# Patient Record
Sex: FEMALE | Race: WHITE | NOT HISPANIC OR LATINO | Employment: OTHER | URBAN - METROPOLITAN AREA
[De-identification: names, ages, dates, MRNs, and addresses within clinical notes are randomized per-mention and may not be internally consistent; named-entity substitution may affect disease eponyms.]

---

## 2019-11-14 ENCOUNTER — HOSPITAL ENCOUNTER (EMERGENCY)
Facility: MEDICAL CENTER | Age: 34
End: 2019-11-14
Attending: EMERGENCY MEDICINE
Payer: COMMERCIAL

## 2019-11-14 ENCOUNTER — APPOINTMENT (OUTPATIENT)
Dept: RADIOLOGY | Facility: MEDICAL CENTER | Age: 34
End: 2019-11-14
Attending: EMERGENCY MEDICINE
Payer: COMMERCIAL

## 2019-11-14 VITALS
WEIGHT: 187.17 LBS | RESPIRATION RATE: 16 BRPM | HEIGHT: 64 IN | BODY MASS INDEX: 31.95 KG/M2 | DIASTOLIC BLOOD PRESSURE: 72 MMHG | SYSTOLIC BLOOD PRESSURE: 116 MMHG | HEART RATE: 90 BPM | OXYGEN SATURATION: 96 % | TEMPERATURE: 97.7 F

## 2019-11-14 DIAGNOSIS — N61.1 BREAST ABSCESS: ICD-10-CM

## 2019-11-14 DIAGNOSIS — N64.4 BREAST PAIN: ICD-10-CM

## 2019-11-14 PROCEDURE — 76604 US EXAM CHEST: CPT

## 2019-11-14 PROCEDURE — 99284 EMERGENCY DEPT VISIT MOD MDM: CPT

## 2019-11-14 NOTE — ED NOTES
"Pt assessment complete.  Pt reports \"this is my 4th incidence this year\".  Pt reports she has had US and they told her treatment was not necessary.  Pt reports she has never been placed on ABX for this.  "

## 2019-11-14 NOTE — ED PROVIDER NOTES
"      ED Provider Note    Scribed for Rhina Ibrahim M.D. by Angelica Mederos. 11/14/2019, 3:23 PM.    Primary Care Provider: No primary care provider on file.  Means of arrival: Walk-In  History obtained from: Patient  History limited by: None    CHIEF COMPLAINT  Chief Complaint   Patient presents with   • Breast Pain     HPI  Crys Kwon is a 33 y.o. female who presents to the Emergency Department complaining of left breast pain with associated erythema and edema, onset 1 year ago. Patient states that she will experience breast pain intermittently and this is the 4th incident she has experienced this pain. Her breast pain is exacerbated with any type of contact. Per patient, she notices the pain during moments of stress and denies it being related to her menstrual periods. She denies chance of pregnancy and mentions that she has not breast fed in over 5 years. She was seen by her gynecologist a few months ago when she was asymptomatic and was referred to have an ultrasound done. She had the ultrasound with results showing a small benign cyst and was told to return in 6 months for follow-up.     REVIEW OF SYSTEMS  Pertinent positives include breast pain, redness, swelling. Pertinent negatives include no fever, nipple discharge.     PAST MEDICAL HISTORY    No past medical history on file.    SOCIAL HISTORY  None noted    SURGICAL HISTORY  patient denies any surgical history     CURRENT MEDICATIONS  No current facility-administered medications for this encounter.   No current outpatient medications on file.    ALLERGIES  Allergies   Allergen Reactions   • Iodine    • Pcn [Penicillins]        PHYSICAL EXAM  VITAL SIGNS: /94   Pulse (!) 109   Temp 36.4 °C (97.5 °F) (Temporal)   Resp 16   Ht 1.626 m (5' 4\")   Wt 84.9 kg (187 lb 2.7 oz)   SpO2 94%   BMI 32.13 kg/m²   Constitutional: Alert in no apparent distress. Well apearing  HENT: Normocephalic, Atraumatic, Bilateral external ears normal. Nose " normal.   Eyes: Conjunctiva normal, non-icteric.   Breast: On the left breast there is a 5 cm area of induration and erythema just medial and superior to the areola. Very tender to palpation.   Lungs: Non-labored respirations  Skin: Warm, Dry, No erythema, No rash.   Neurologic: Alert, Grossly non-focal.   Psychiatric: Affect normal, Judgment normal, Mood normal, Appears appropriate and not intoxicated.       RADIOLOGY  US-CHEST   Final Result      1.  2.9 x 2.3 x 1.0 cm complex hyperemic fluid collection in the superficial aspect of the left periareolar region at the 9:00 position. This is suspicious for left breast superficial abscess.      2.  Clinical follow-up after appropriate therapy is recommended to document clearance.        The radiologist's interpretations of all radiological studies have been reviewed by me.          COURSE & MEDICAL DECISION MAKING  Pertinent Labs & Imaging studies reviewed. (See chart for details)    3:23 PM - Patient seen and examined at bedside. Discussed with patient the different options available at this point such as ultrasound to evaluate for possible abscess. She is looking more for answers as to what this is. I have referred her to the breast imaging center however she is not interested in the treatments. I explained to her the possibility of breast abscess but she would still ana luisa the ultrasound. Ordered for US-chest to further evaluate.     4:33 PM - Recheck. Updated the patient on her ultrasound results indicating a small abscess or infection. Discussed treatment options with her and patient is declining antibiotic treatment.  She understands that this may get worse without appropriate treatment and will pursue outpatient imaging and work-up.      The patient will return for new or worsening symptoms and is stable at the time of discharge. Patient was given return precautions. Patient and/or family member verbalizes understanding and will comply.    DISPOSITION:  Patient  will be discharged home in stable condition.    FOLLOW UP:  [facility]  [address]  [facility] [id number] [phone]  Schedule an appointment as soon as possible for a visit       [facility], Emergency Dept  [address]  [facility] [id number] [phone]    Return for worsening redness swelling pain or other concerns.      OUTPATIENT MEDICATIONS:  New Prescriptions    No medications on file         FINAL IMPRESSION  1. Breast pain    2. Breast abscess         This dictation has been created using voice recognition software and/or scribes. The accuracy of the dictation is limited by the abilities of the software and the expertise of the scribes. I expect there may be some errors of grammar and possibly content. I made every attempt to manually correct the errors within my dictation. However, errors related to voice recognition software and/or scribes may still exist and should be interpreted within the appropriate context.     I, Angelica Mederos (Scribe), am scribing for, and in the presence of, Rhina Ibrahim M.D..    Electronically signed by: Angelica Mederos (Scribe), 11/14/2019    IRhina M.D. personally performed the services described in this documentation, as scribed by Angelica Mederos in my presence, and it is both accurate and complete. E    The note accurately reflects work and decisions made by me.  Rhina Ibrahim  11/14/2019  4:47 PM

## 2019-11-15 NOTE — DISCHARGE INSTRUCTIONS
You have a breast infection and have declined taking antibiotics.  Please return for worsening symptoms or if you change your mind and would like antibiotics for this.    US results:     1.  2.9 x 2.3 x 1.0 cm complex hyperemic fluid collection in the superficial aspect of the left periareolar region at the 9:00 position. This is suspicious for left breast superficial abscess.    2.  Clinical follow-up after appropriate therapy is recommended to document clearance.

## 2019-11-15 NOTE — ED NOTES
PT VU dcis and continues to refuse abx until follow up with breast center. Pt left A&OX4 ambulatory with steady gait to discharge. Pt left with dtr.

## 2020-07-25 VITALS
HEIGHT: 64 IN | BODY MASS INDEX: 31.31 KG/M2 | RESPIRATION RATE: 16 BRPM | OXYGEN SATURATION: 98 % | SYSTOLIC BLOOD PRESSURE: 108 MMHG | TEMPERATURE: 98.2 F | WEIGHT: 183.42 LBS | HEART RATE: 88 BPM | DIASTOLIC BLOOD PRESSURE: 75 MMHG

## 2020-07-25 PROCEDURE — 99284 EMERGENCY DEPT VISIT MOD MDM: CPT

## 2020-07-26 ENCOUNTER — HOSPITAL ENCOUNTER (EMERGENCY)
Facility: MEDICAL CENTER | Age: 35
End: 2020-07-26
Attending: EMERGENCY MEDICINE
Payer: MEDICAID

## 2020-07-26 ENCOUNTER — APPOINTMENT (OUTPATIENT)
Dept: RADIOLOGY | Facility: MEDICAL CENTER | Age: 35
End: 2020-07-26
Attending: EMERGENCY MEDICINE
Payer: MEDICAID

## 2020-07-26 DIAGNOSIS — N93.9 VAGINAL BLEEDING: ICD-10-CM

## 2020-07-26 LAB
ALBUMIN SERPL BCP-MCNC: 4.9 G/DL (ref 3.2–4.9)
ALBUMIN/GLOB SERPL: 1.8 G/DL
ALP SERPL-CCNC: 87 U/L (ref 30–99)
ALT SERPL-CCNC: 10 U/L (ref 2–50)
ANION GAP SERPL CALC-SCNC: 12 MMOL/L (ref 7–16)
APPEARANCE UR: ABNORMAL
AST SERPL-CCNC: 18 U/L (ref 12–45)
B-HCG SERPL-ACNC: 68.8 MIU/ML (ref 0–5)
BACTERIA #/AREA URNS HPF: ABNORMAL /HPF
BASOPHILS # BLD AUTO: 0.2 % (ref 0–1.8)
BASOPHILS # BLD: 0.02 K/UL (ref 0–0.12)
BILIRUB SERPL-MCNC: 0.3 MG/DL (ref 0.1–1.5)
BILIRUB UR QL STRIP.AUTO: NEGATIVE
BUN SERPL-MCNC: 9 MG/DL (ref 8–22)
CALCIUM SERPL-MCNC: 9.4 MG/DL (ref 8.5–10.5)
CHLORIDE SERPL-SCNC: 104 MMOL/L (ref 96–112)
CO2 SERPL-SCNC: 22 MMOL/L (ref 20–33)
COLOR UR: YELLOW
CREAT SERPL-MCNC: 0.72 MG/DL (ref 0.5–1.4)
EOSINOPHIL # BLD AUTO: 0.08 K/UL (ref 0–0.51)
EOSINOPHIL NFR BLD: 0.9 % (ref 0–6.9)
EPI CELLS #/AREA URNS HPF: ABNORMAL /HPF
ERYTHROCYTE [DISTWIDTH] IN BLOOD BY AUTOMATED COUNT: 44.3 FL (ref 35.9–50)
GLOBULIN SER CALC-MCNC: 2.8 G/DL (ref 1.9–3.5)
GLUCOSE SERPL-MCNC: 97 MG/DL (ref 65–99)
GLUCOSE UR STRIP.AUTO-MCNC: NEGATIVE MG/DL
HCT VFR BLD AUTO: 42.7 % (ref 37–47)
HGB BLD-MCNC: 13.9 G/DL (ref 12–16)
IMM GRANULOCYTES # BLD AUTO: 0.02 K/UL (ref 0–0.11)
IMM GRANULOCYTES NFR BLD AUTO: 0.2 % (ref 0–0.9)
KETONES UR STRIP.AUTO-MCNC: ABNORMAL MG/DL
LEUKOCYTE ESTERASE UR QL STRIP.AUTO: ABNORMAL
LIPASE SERPL-CCNC: 40 U/L (ref 11–82)
LYMPHOCYTES # BLD AUTO: 2.81 K/UL (ref 1–4.8)
LYMPHOCYTES NFR BLD: 30.1 % (ref 22–41)
MCH RBC QN AUTO: 30.7 PG (ref 27–33)
MCHC RBC AUTO-ENTMCNC: 32.6 G/DL (ref 33.6–35)
MCV RBC AUTO: 94.3 FL (ref 81.4–97.8)
MICRO URNS: ABNORMAL
MONOCYTES # BLD AUTO: 0.54 K/UL (ref 0–0.85)
MONOCYTES NFR BLD AUTO: 5.8 % (ref 0–13.4)
MUCOUS THREADS #/AREA URNS HPF: ABNORMAL /HPF
NEUTROPHILS # BLD AUTO: 5.88 K/UL (ref 2–7.15)
NEUTROPHILS NFR BLD: 62.8 % (ref 44–72)
NITRITE UR QL STRIP.AUTO: NEGATIVE
NRBC # BLD AUTO: 0 K/UL
NRBC BLD-RTO: 0 /100 WBC
NUMBER OF RH DOSES IND 8505RD: NORMAL
PH UR STRIP.AUTO: 6.5 [PH] (ref 5–8)
PLATELET # BLD AUTO: 224 K/UL (ref 164–446)
PMV BLD AUTO: 11.1 FL (ref 9–12.9)
POTASSIUM SERPL-SCNC: 3.7 MMOL/L (ref 3.6–5.5)
PROT SERPL-MCNC: 7.7 G/DL (ref 6–8.2)
PROT UR QL STRIP: NEGATIVE MG/DL
RBC # BLD AUTO: 4.53 M/UL (ref 4.2–5.4)
RBC # URNS HPF: ABNORMAL /HPF
RBC UR QL AUTO: ABNORMAL
RH BLD: NORMAL
SODIUM SERPL-SCNC: 138 MMOL/L (ref 135–145)
SP GR UR STRIP.AUTO: 1.03
UROBILINOGEN UR STRIP.AUTO-MCNC: 1 MG/DL
WBC # BLD AUTO: 9.4 K/UL (ref 4.8–10.8)
WBC #/AREA URNS HPF: ABNORMAL /HPF

## 2020-07-26 PROCEDURE — 84702 CHORIONIC GONADOTROPIN TEST: CPT

## 2020-07-26 PROCEDURE — 80053 COMPREHEN METABOLIC PANEL: CPT

## 2020-07-26 PROCEDURE — 85025 COMPLETE CBC W/AUTO DIFF WBC: CPT

## 2020-07-26 PROCEDURE — 36415 COLL VENOUS BLD VENIPUNCTURE: CPT

## 2020-07-26 PROCEDURE — 86901 BLOOD TYPING SEROLOGIC RH(D): CPT

## 2020-07-26 PROCEDURE — 81001 URINALYSIS AUTO W/SCOPE: CPT

## 2020-07-26 PROCEDURE — 76801 OB US < 14 WKS SINGLE FETUS: CPT

## 2020-07-26 PROCEDURE — 83690 ASSAY OF LIPASE: CPT

## 2020-07-26 NOTE — DISCHARGE INSTRUCTIONS
You are seen in the emergency department for vaginal bleeding the setting of a positive pregnancy test.  Your beta-hCG is low and your ultrasound does not show pregnancy.  This most likely means of miscarriage, however it is very important you get a repeat beta-hCG level to ensure that it is heading down to 0.    Please contact the women's health to arrange for an appointment.    Please return to the emergency department or seek medical attention if you develop:  Fevers, excessive bleeding causing lightheadedness or feeling like he might pass out, severe abdominal pain, any other new or concerning findings    ================================  Coronavirus Information    Your visit did NOT appear to relate to coronavirus, but if you or your family develop symptoms that concern you for coronavirus (please see CDC website for symptoms), please contact the [facility] [place] (or your local health department)  or your healthcare provider before going to a medical facility:    [facility]  24hr hotline: 600.322.7277    Information is available from the Centers for Disease Control and Prevention  www.CDC.gov    and     [facility]  [email or web address]    If you are severely ill or having a hard time breathing, please immediatly seek medical care. Notify the  or Emergency Department Triage about your symptoms.

## 2020-07-26 NOTE — ED PROVIDER NOTES
ED Provider Note    Scribed for Elvis Alicea M.D. by Remy Rocha. 2020,  12:21 AM.    Means of Arrival: Walk-in  History obtained from: Patient  History limited by: None    CHIEF COMPLAINT  Chief Complaint   Patient presents with   • Pregnancy     6 weeks, has not recieved US.    • Vaginal Bleeding     x1 hour, described as red/brown spotting.        HPI  Crys Kwon is a 34 y.o. female with a pregnancy history of  who is six weeks pregnancy by home pregnancy test and dates who presents to the Emergency Department complaining of vaginal bleeding that began today. The bleeding began as some mild spotting and has since progressed to bleeding more consistent with her normal menstrual bleeding. Her last normal menstrual period was six weeks ago. She also complains of some mild abdominal pain. No fever or chills. She denies a history of miscarriage.    PPE Note: I personally donned full PPE for all patient encounters during this visit, including being clean-shaven with a surgical mask, gloves, and goggles.     Scribe remained outside the patient's room and did not have any contact with the patient for the duration of patient encounter.     REVIEW OF SYSTEMS  CONSTITUTIONAL:  No fever or chills.  GASTROINTESTINAL:  Positive for abdominal pain.  GENITOURINARY:   Positive for vaginal bleeding     See HPI for further details.   All other systems are negative.     PAST MEDICAL HISTORY  History reviewed. No pertinent past medical history.    FAMILY HISTORY  History reviewed. No pertinent family history.    SOCIAL HISTORY   reports that she has never smoked. She has never used smokeless tobacco. She reports previous alcohol use. She reports previous drug use.    SURGICAL HISTORY  History reviewed. No pertinent surgical history.    CURRENT MEDICATIONS  Home Medications     Reviewed by Jeff Gleason R.N. (Registered Nurse) on 20 at 2320  Med List Status: Partial   Medication Last Dose Status  "       Patient Enrique Taking any Medications                       ALLERGIES  Allergies   Allergen Reactions   • Iodine    • Pcn [Penicillins]        PHYSICAL EXAM  VITAL SIGNS: /75   Pulse 88   Temp 36.8 °C (98.2 °F) (Temporal)   Resp 16   Ht 1.626 m (5' 4\")   Wt 83.2 kg (183 lb 6.8 oz)   SpO2 98%   BMI 31.48 kg/m²    Gen: Alert, no acute distress  HEENT: ATNC  Eyes: PERRL, EOMI, normal conjunctiva.   Neck: trachea midline  Resp: no respiratory distress, clear to auscultation  CV: No JVD, no murmur, no rub, no gallop  Abd: non-distended, soft, Minimal lower abdominal tenderness  Pelvic: Patient declines pelvic exam  Ext: No deformities  Psych: normal mood  Neuro: speech fluent    DIAGNOSTIC STUDIES / PROCEDURES     LABS  Labs Reviewed   CBC WITH DIFFERENTIAL - Abnormal; Notable for the following components:       Result Value    MCHC 32.6 (*)     All other components within normal limits   HCG QUANTITATIVE - Abnormal; Notable for the following components:    Bhcg 68.8 (*)     All other components within normal limits   URINALYSIS,CULTURE IF INDICATED - Abnormal; Notable for the following components:    Character Hazy (*)     Ketones Trace (*)     Leukocyte Esterase Small (*)     Occult Blood Large (*)     All other components within normal limits   URINE MICROSCOPIC (W/UA) - Abnormal; Notable for the following components:    RBC 5-10 (*)     Bacteria Moderate (*)     All other components within normal limits   COMP METABOLIC PANEL   LIPASE   RH TYPE FOR RHOGAM FROM E.D.    Narrative:     Print Consent?->No   ESTIMATED GFR     All labs reviewed by me.    RADIOLOGY  US-OB 1ST TRIMESTER WITH TRANSVAGINAL (COMBO)   Final Result      No evidence of intrauterine pregnancy.        The radiologist’s interpretation of all radiology studies have been reviewed by me.    COURSE & MEDICAL DECISION MAKING  Pertinent Labs & Imaging studies reviewed. (See chart for details)    12:21 AM Patient seen and examined at " bedside. Ordered for labs and imaging to evaluate.    1:35 AM Patient declined pelvic exam.    1:55 AM - Re-examined; The patient is resting in bed comfortably. I discussed plans for discharge. She was given a referral to [facility] and instructed to return to the ED if her symptoms worsen. Patient understands and agrees.    Medical Decision Making:  Patient with positive pregnancy test presents with vaginal bleeding.  Her hCG is quite low, no IUP on ultrasound.  Low suspicion for ectopic.  This is most likely a miscarriage.  Patient was not willing to undergo pelvic exam to evaluate for os opening or presence of tissue.  With negative ultrasound, presence of tissue unlikely.  She was counseled repeatedly low the importance of following up her beta-hCG.  Patient was given an order form to obtain the hCG quant in 2 days.  She is referred to the [facility] for follow-up.  She was given return precautions, anticipatory guidance.  She is Rh+.  No urinary symptoms to suggest urinary tract infection.    The patient will return for new or worsening symptoms and is stable at the time of discharge.    DISPOSITION:  Patient will be discharged home in stable condition.    FOLLOW UP:  [address]  [facility] [id number] [phone]  Schedule an appointment as soon as possible for a visit       [facility], Emergency Dept  [address] [facility] [address] [phone]    If symptoms worsen      FINAL IMPRESSION  1. Vaginal bleeding            Remy SNOWDEN (Rashaad), am scribing for, and in the presence of, Elvis Alicea M.D..    Electronically signed by: Remy Rocha (Rashaad), 7/26/2020    Elvis SNOWDEN M.D. personally performed the services described in this documentation, as scribed by Remy Rocha in my presence, and it is both accurate and complete.    C    The note accurately  reflects work and decisions made by me.  Elvis Alicea M.D.  7/26/2020  4:43 AM

## 2020-07-26 NOTE — ED TRIAGE NOTES
"[employer]  34 y.o. female  Chief Complaint   Patient presents with   • Pregnancy     6 weeks, has not recieved US.    • Vaginal Bleeding     x1 hour, described as red/brown spotting.      Pt ambulated to triage with steady gait for above complaint.     Pt given urine specimen cup.     Pt denies travel outside [facility] in the past 14 days, and denies respiratory symptoms.     Pt back to [facility], mask applied. Educated to inform staff of any concerns or changes.     Blood Pressure: 108/75, Pulse: 88, Respiration: 16, Temperature: 36.8 °C (98.2 °F), Height: 162.6 cm (5' 4\"), Weight: 83.2 kg (183 lb 6.8 oz), BMI (Calculated): 31.48, BSA (Calculated): 1.9, Pulse Oximetry: 98 %    "

## 2020-07-26 NOTE — ED NOTES
Discharge education provided. Patient given lab slip for follow up. Patient discharged to self care with all belongings.

## 2020-08-06 ENCOUNTER — APPOINTMENT (OUTPATIENT)
Dept: RADIOLOGY | Facility: MEDICAL CENTER | Age: 35
End: 2020-08-06
Attending: EMERGENCY MEDICINE
Payer: MEDICAID

## 2020-08-06 ENCOUNTER — HOSPITAL ENCOUNTER (EMERGENCY)
Facility: MEDICAL CENTER | Age: 35
End: 2020-08-07
Attending: EMERGENCY MEDICINE
Payer: MEDICAID

## 2020-08-06 DIAGNOSIS — O03.9 MISCARRIAGE: ICD-10-CM

## 2020-08-06 LAB
ALBUMIN SERPL BCP-MCNC: 4.7 G/DL (ref 3.2–4.9)
ALBUMIN/GLOB SERPL: 1.6 G/DL
ALP SERPL-CCNC: 94 U/L (ref 30–99)
ALT SERPL-CCNC: 9 U/L (ref 2–50)
ANION GAP SERPL CALC-SCNC: 15 MMOL/L (ref 7–16)
AST SERPL-CCNC: 12 U/L (ref 12–45)
B-HCG SERPL-ACNC: 21.5 MIU/ML (ref 0–5)
BASOPHILS # BLD AUTO: 0.4 % (ref 0–1.8)
BASOPHILS # BLD: 0.04 K/UL (ref 0–0.12)
BILIRUB SERPL-MCNC: 0.2 MG/DL (ref 0.1–1.5)
BUN SERPL-MCNC: 9 MG/DL (ref 8–22)
CALCIUM SERPL-MCNC: 9.2 MG/DL (ref 8.5–10.5)
CHLORIDE SERPL-SCNC: 101 MMOL/L (ref 96–112)
CO2 SERPL-SCNC: 24 MMOL/L (ref 20–33)
CREAT SERPL-MCNC: 0.82 MG/DL (ref 0.5–1.4)
EOSINOPHIL # BLD AUTO: 0.12 K/UL (ref 0–0.51)
EOSINOPHIL NFR BLD: 1.2 % (ref 0–6.9)
ERYTHROCYTE [DISTWIDTH] IN BLOOD BY AUTOMATED COUNT: 44.3 FL (ref 35.9–50)
GLOBULIN SER CALC-MCNC: 3 G/DL (ref 1.9–3.5)
GLUCOSE SERPL-MCNC: 103 MG/DL (ref 65–99)
HCT VFR BLD AUTO: 44.4 % (ref 37–47)
HGB BLD-MCNC: 14.5 G/DL (ref 12–16)
IMM GRANULOCYTES # BLD AUTO: 0.02 K/UL (ref 0–0.11)
IMM GRANULOCYTES NFR BLD AUTO: 0.2 % (ref 0–0.9)
LYMPHOCYTES # BLD AUTO: 2.92 K/UL (ref 1–4.8)
LYMPHOCYTES NFR BLD: 29.9 % (ref 22–41)
MCH RBC QN AUTO: 31.3 PG (ref 27–33)
MCHC RBC AUTO-ENTMCNC: 32.7 G/DL (ref 33.6–35)
MCV RBC AUTO: 95.9 FL (ref 81.4–97.8)
MONOCYTES # BLD AUTO: 0.45 K/UL (ref 0–0.85)
MONOCYTES NFR BLD AUTO: 4.6 % (ref 0–13.4)
NEUTROPHILS # BLD AUTO: 6.21 K/UL (ref 2–7.15)
NEUTROPHILS NFR BLD: 63.7 % (ref 44–72)
NRBC # BLD AUTO: 0 K/UL
NRBC BLD-RTO: 0 /100 WBC
PLATELET # BLD AUTO: 261 K/UL (ref 164–446)
PMV BLD AUTO: 10.7 FL (ref 9–12.9)
POTASSIUM SERPL-SCNC: 4.2 MMOL/L (ref 3.6–5.5)
PROT SERPL-MCNC: 7.7 G/DL (ref 6–8.2)
RBC # BLD AUTO: 4.63 M/UL (ref 4.2–5.4)
SODIUM SERPL-SCNC: 140 MMOL/L (ref 135–145)
WBC # BLD AUTO: 9.8 K/UL (ref 4.8–10.8)

## 2020-08-06 PROCEDURE — 80053 COMPREHEN METABOLIC PANEL: CPT

## 2020-08-06 PROCEDURE — 85025 COMPLETE CBC W/AUTO DIFF WBC: CPT

## 2020-08-06 PROCEDURE — 99284 EMERGENCY DEPT VISIT MOD MDM: CPT

## 2020-08-06 PROCEDURE — 84702 CHORIONIC GONADOTROPIN TEST: CPT

## 2020-08-06 PROCEDURE — 76801 OB US < 14 WKS SINGLE FETUS: CPT

## 2020-08-06 ASSESSMENT — FIBROSIS 4 INDEX: FIB4 SCORE: 0.89

## 2020-08-07 VITALS
HEIGHT: 64 IN | SYSTOLIC BLOOD PRESSURE: 110 MMHG | RESPIRATION RATE: 16 BRPM | TEMPERATURE: 98.3 F | BODY MASS INDEX: 31.47 KG/M2 | WEIGHT: 184.3 LBS | HEART RATE: 82 BPM | DIASTOLIC BLOOD PRESSURE: 72 MMHG | OXYGEN SATURATION: 96 %

## 2020-08-07 NOTE — ED TRIAGE NOTES
"Crys Kwon   34 y.o. female   Chief Complaint   Patient presents with   • Vaginal Bleeding     pt was seen here 12 days ago for threatened miscarriage. pt stopped bleeding, then started having bleeding over the last 2 days again. pt took another pregnancy test and it was positive, shes concerned  that she was still pregnant and now could be miscarrying       Pt using thin panty liners, reports bleeding as scant.   Pt amb to triage with steady gait for above complaint  Pt is alert and oriented, speaking in full sentences, follows commands and responds appropriately to questions. NAD. Resp are even and unlabored.   Pt placed in lobby. Pt educated on triage process. Pt encouraged to alert staff for any changes.    /67   Pulse 79   Temp 36.7 °C (98.1 °F) (Temporal)   Resp 17   Ht 1.626 m (5' 4\")   Wt 83.6 kg (184 lb 4.9 oz)   SpO2 97%   BMI 31.64 kg/m²     "

## 2020-08-07 NOTE — DISCHARGE INSTRUCTIONS
It is incredibly important that you follow-up with the women's health, for majority of the pregnancy center either tomorrow or early next week.  If you develop worsening bleeding, significant abdominal pain faintness or any other concerns please return immediately to the emergency department.

## 2020-08-07 NOTE — ED PROVIDER NOTES
"ED Provider Note    CHIEF COMPLAINT  Chief Complaint   Patient presents with   • Vaginal Bleeding     pt was seen here 12 days ago for threatened miscarriage. pt stopped bleeding, then started having bleeding over the last 2 days again. pt took another pregnancy test and it was positive, shes concerned  that she was still pregnant and now could be miscarrying        HPI  Crys Kwon is a 34 y.o. female G5, P4, 6 weeks pregnancy by date who was seen here 11 days prior vaginal bleeding and pain in the setting of pregnancy.  Patient's beta-hCG was 68.8 at that time, her ultrasound was unremarkable.  Patient reports the bleeding resolved but the symptoms recurred, she took another pregnancy test today which was positive and therefore she presented back to the emergency department.  Patient reports she has had some minimal spotting.  She denies any abdominal pain.  She denies any faintness.  Patient denies any dizziness nausea or vomiting.  She denies any dysuria urgency or frequency.  Reports that she has been taking daily pregnancy tests, they were negative for a while and then she thinks they might be positive but she is not entirely sure as \"she could not quite tell\"    REVIEW OF SYSTEMS  ROS  See HPI for further details. All other systems are negative.     PAST MEDICAL HISTORY   has a past medical history of Patient denies medical problems.    SOCIAL HISTORY  Social History     Tobacco Use   • Smoking status: Never Smoker   • Smokeless tobacco: Never Used   Substance and Sexual Activity   • Alcohol use: Not Currently   • Drug use: Not Currently   • Sexual activity: Not on file       SURGICAL HISTORY  patient denies any surgical history    CURRENT MEDICATIONS  Home Medications     Reviewed by Gianna Toth R.N. (Registered Nurse) on 08/06/20 at 2031  Med List Status: Complete   Medication Last Dose Status        Patient Enrique Taking any Medications                       ALLERGIES  Allergies   Allergen " Reactions   • Iodine    • Pcn [Penicillins]      PHYSICAL EXAM  Physical Exam   Constitutional: She is oriented to person, place, and time. She appears well-developed and well-nourished.   HENT:   Head: Normocephalic and atraumatic.   Eyes: Conjunctivae are normal.   Neck: Normal range of motion. Neck supple.   Cardiovascular: Normal rate and regular rhythm.   Pulmonary/Chest: Effort normal and breath sounds normal.   Abdominal: Soft. Bowel sounds are normal. She exhibits no distension. There is no abdominal tenderness. There is no rebound.   Neurological: She is alert and oriented to person, place, and time.   Skin: Skin is warm and dry. No rash noted.   Psychiatric: She has a normal mood and affect. Her behavior is normal.     Pelvic refused    COURSE & MEDICAL DECISION MAKING  Pertinent Labs & Imaging studies reviewed. (See chart for details)    Patient here with ongoing vaginal spotting.  Her abdominal exam is entirely benign.  I believe ectopic pregnancy is unlikely.  Patient beta-hCG is dropping which is most consistent with a miscarriage.  Highly atypical of ectopic pregnancy, but I did check an ultrasound to confirm there is no obvious large adnexal mass.  There is no.  Nevertheless I have encouraged patient to follow-up with [facility] center to ensure that she has close follow-up and potential repeat ultrasound there.  I discussed return precautions in depth with patient.  Patient has continually deferred any pelvic exam, I discussed with her that we could miss a potential lesion or infection without this but patient continues to refuse.  Patient denies any vaginal discharge dysuria urgency or frequency or infectious symptoms.     The patient will return for worsening symptoms and is stable at the time of discharge. The patient verbalizes understanding and will comply.    FINAL IMPRESSION     1. Miscarriage               Electronically signed by: Sanjay Gee M.D., 8/6/2020 11:00 PM

## 2020-08-07 NOTE — ED NOTES
Report received from Brynn COLUNGA. Patient hooked up to monitors. VSS. Patient to discharge home soon per ERP.

## 2020-12-25 ENCOUNTER — HOSPITAL ENCOUNTER (EMERGENCY)
Facility: MEDICAL CENTER | Age: 35
End: 2020-12-25
Attending: EMERGENCY MEDICINE
Payer: MEDICAID

## 2020-12-25 VITALS
TEMPERATURE: 96.8 F | DIASTOLIC BLOOD PRESSURE: 71 MMHG | OXYGEN SATURATION: 95 % | SYSTOLIC BLOOD PRESSURE: 111 MMHG | RESPIRATION RATE: 16 BRPM | HEIGHT: 64 IN | HEART RATE: 89 BPM | BODY MASS INDEX: 27.33 KG/M2 | WEIGHT: 160.05 LBS

## 2020-12-25 DIAGNOSIS — Z86.39 HISTORY OF HORMONE DISTURBANCE: ICD-10-CM

## 2020-12-25 LAB
B-HCG SERPL-ACNC: <1 MIU/ML (ref 0–5)
PROGEST SERPL-MCNC: 24.2 NG/ML

## 2020-12-25 PROCEDURE — 84144 ASSAY OF PROGESTERONE: CPT

## 2020-12-25 PROCEDURE — 36415 COLL VENOUS BLD VENIPUNCTURE: CPT

## 2020-12-25 PROCEDURE — 99283 EMERGENCY DEPT VISIT LOW MDM: CPT

## 2020-12-25 PROCEDURE — 84702 CHORIONIC GONADOTROPIN TEST: CPT

## 2020-12-25 RX ORDER — ASPIRIN 81 MG/1
81 TABLET, CHEWABLE ORAL DAILY
COMMUNITY

## 2020-12-25 ASSESSMENT — FIBROSIS 4 INDEX: FIB4 SCORE: .5517241379310344828

## 2020-12-25 ASSESSMENT — ENCOUNTER SYMPTOMS
NAUSEA: 0
ABDOMINAL PAIN: 0
VOMITING: 0

## 2020-12-25 NOTE — ED NOTES
Patient verbalized understanding of discharge instructions, provided with discharge paperwork, gait steady, ambulated independently to SIMONE stokes.

## 2020-12-25 NOTE — ED PROVIDER NOTES
ED Provider Note    Means of arrival: walk-in  History obtained from: patient  History limited by: none    CHIEF COMPLAINT  Chief Complaint   Patient presents with   • Other     pt has had 3 miscarriages this year, is concerned that her progesterone is too low and would like it checked. pt tested positive for pregnancy this week.      PPE Note: I personally donned full PPE for all patient encounters during this visit, including wearing a mask and gloves. Scribe remained outside the patient's room and did not have any contact with the patient for the duration of patient encounter.      HPI  Crys Kwon is a 35 y.o. female who presents to the Emergency Department for evaluation of progesterone level.  Patient reports that she has had 3 miscarriages this year, previously 9 years ago had healthy pregnancies.  Patient was started on progesterone prophylactically by her obstetrician Dr. Lety Claire as she is still trying to get pregnant and hoping to have a healthy pregnancy.  Patient reports that her last menstrual period was at the end of November and a couple of days ago had a positive home urine pregnancy test.  Patient reports that she is very anxious that she is not on enough progesterone in order to support a healthy pregnancy and would like her progesterone level evaluated.  She states over the holidays she will not be able to get her into her obstetrician in a timely manner and therefore came into the emergency department for further evaluation.  She denies abdominal pain, nausea, vomiting, and vaginal bleeding.    REVIEW OF SYSTEMS  Review of Systems   Gastrointestinal: Negative for abdominal pain, nausea and vomiting.   Genitourinary: Negative for dysuria.        Denies vaginal bleeding         PAST MEDICAL HISTORY   has a past medical history of Patient denies medical problems.    SURGICAL HISTORY  patient denies any surgical history    SOCIAL HISTORY  Social History     Tobacco Use   • Smoking status:  "Never Smoker   • Smokeless tobacco: Never Used   Substance Use Topics   • Alcohol use: Not Currently   • Drug use: Not Currently      Social History     Substance and Sexual Activity   Drug Use Not Currently       FAMILY HISTORY  History reviewed. No pertinent family history.    CURRENT MEDICATIONS  Home Medications     Reviewed by Leanne Marsh R.N. (Registered Nurse) on 12/25/20 at 0234  Med List Status: Complete   Medication Last Dose Status   aspirin (ASA) 81 MG Chew Tab chewable tablet  Active   progesterone (PROMETRIUM) 100 MG Cap  Active              ALLERGIES  Allergies   Allergen Reactions   • Iodine    • Pcn [Penicillins]        PHYSICAL EXAM  VITAL SIGNS: /88   Pulse 93   Temp 36 °C (96.8 °F) (Temporal)   Resp 16   Ht 1.626 m (5' 4\")   Wt 72.6 kg (160 lb 0.9 oz)   SpO2 95%   BMI 27.47 kg/m²   Vitals reviewed by myself.  Physical Exam  Nursing note and vitals reviewed.  Constitutional: Well-developed and well-nourished. No acute distress.   HENT: Head is normocephalic and atraumatic.  Eyes: extra-ocular movements intact  Cardiovascular: Regular rate and regular rhythm  Pulmonary/Chest: Normal respiratory effort  Musculoskeletal: Extremities exhibit normal range of motion without edema or tenderness.   Neurological: Awake and alert  Skin: Skin is warm and dry. No rash.        DIAGNOSTIC STUDIES /  LABS  Labs Reviewed   PROGESTERONE   HCG QUANTITATIVE     All labs reviewed by me.    REASSESSMENT  2:43 AM Patient was seen and evaluated. Ordered for progesterone levels and HCG quant to evaluate her symptoms.      3:02 AM I discussed the patient's case and the above findings with Dr. Bedoya (OB/GYN) who advised that the patient is currently at the highest progesterone dosage.    COURSE & MEDICAL DECISION MAKING  Nursing notes, VS, PMSFHx reviewed in chart.    Patient is a 35-year-old female who comes in for evaluation of concern for preventing miscarriage.  On exam patient is " well-appearing with vitals in normal limits.  She has no signs or symptoms concerning for threatened  as she is not having any abdominal pain or bleeding.  I advised patient that in the emergency department we do not routinely prescribe medications to prevent miscarriage, but this is done by OB/GYN specialists, and she will need to follow-up with Dr. Lety Claire.  I did discuss the case with on-call OB Dr. Bedoya (for Dr. Lety Claire) who agrees that this is outpatient follow-up.  Patient is on maximum dose of outpatient progesterone at 200 mg daily and there is no indication to increase her dose at this time.  She advises on obtaining baseline quantitative hCG and progesterone which can be followed in the clinic.  I advised patient of this plan and she is amenable to this, however would like to wait for her hCG and progesterone levels as she would like to know them prior to discharge.  Patient's beta quant returns and is undetectable, unclear if she had a false positive pregnancy at home.  Progesterone level is 24.  I advised patient of the findings and that currently she does not appear to be pregnant.  Patient will follow up with her obstetrician.  She is then discharged in stable condition.    The patient will return for new or worsening symptoms and is stable at the time of discharge. The patient is referred to a primary physician for blood pressure management, diabetic screening, and for all other preventative health concerns.    DISPOSITION:  Patient will be discharged home in stable condition.    FOLLOW UP:  Lety Watts M.D.  [address] [place] [id number]  [phone]            FINAL IMPRESSION  1. History of hormone disturbance          I, Tono Low (Rashaad), am scribing for, and in the presence of, No att. providers found.    Electronically signed by: Tono Low (Rashaad), 2020    I, No att. providers found personally performed the services described in this  documentation, as scribed by Tono Low in my presence, and it is both accurate and complete.    The note accurately reflects work and decisions made by me.  Maria Eugenia Montiel M.D.  12/25/2020  4:27 AM

## 2020-12-25 NOTE — ED NOTES
Assist RN:  Pt ambulated from lobby to [facility] without assistance, tolerated well. Pt is now sitting up in bed with even and unlabored breaths, in no apparent distress at this time. Will continue to monitor pt while awaiting test results and further orders.

## 2020-12-25 NOTE — ED TRIAGE NOTES
"Crys Kwon  35 y.o. female  Chief Complaint   Patient presents with   • Other     pt has had 3 miscarriages this year, is concerned that her progesterone is too low and would like it checked. pt tested positive for pregnancy this week.        Patient ambulatory to triage with a steady gait for above complaint. Pt reports light spotting, denies any other complaint. Pt takes 200mg progesterone daily but is concerned that now that she is pregnant, it may not be enough.    Patient is alert and oriented, speaking in full sentences, following commands, and responding appropriately to questions. Educated on triage process and instructed patient to alert staff to any changes in condition or worsening symptoms.     /88   Pulse 93   Temp 36 °C (96.8 °F) (Temporal)   Resp 16   Ht 1.626 m (5' 4\")   Wt 72.6 kg (160 lb 0.9 oz)   SpO2 95%   BMI 27.47 kg/m²       "

## 2020-12-25 NOTE — DISCHARGE INSTRUCTIONS
Your pregnancy blood level was undetectable, your progesterone level today is 24.2.  Follow-up with your gynecologist

## 2021-03-19 ENCOUNTER — HOSPITAL ENCOUNTER (EMERGENCY)
Facility: MEDICAL CENTER | Age: 36
End: 2021-03-19
Attending: EMERGENCY MEDICINE
Payer: MEDICAID

## 2021-03-19 ENCOUNTER — APPOINTMENT (OUTPATIENT)
Dept: RADIOLOGY | Facility: MEDICAL CENTER | Age: 36
End: 2021-03-19
Attending: EMERGENCY MEDICINE
Payer: MEDICAID

## 2021-03-19 VITALS
HEART RATE: 77 BPM | TEMPERATURE: 98.3 F | SYSTOLIC BLOOD PRESSURE: 115 MMHG | BODY MASS INDEX: 30.37 KG/M2 | WEIGHT: 177.91 LBS | DIASTOLIC BLOOD PRESSURE: 63 MMHG | OXYGEN SATURATION: 95 % | RESPIRATION RATE: 16 BRPM | HEIGHT: 64 IN

## 2021-03-19 DIAGNOSIS — O03.9 SPONTANEOUS MISCARRIAGE: ICD-10-CM

## 2021-03-19 LAB
ALBUMIN SERPL BCP-MCNC: 4.3 G/DL (ref 3.2–4.9)
ALBUMIN/GLOB SERPL: 1.4 G/DL
ALP SERPL-CCNC: 89 U/L (ref 30–99)
ALT SERPL-CCNC: 15 U/L (ref 2–50)
ANION GAP SERPL CALC-SCNC: 12 MMOL/L (ref 7–16)
APPEARANCE UR: CLEAR
AST SERPL-CCNC: 16 U/L (ref 12–45)
B-HCG SERPL-ACNC: 70.1 MIU/ML (ref 0–5)
BACTERIA #/AREA URNS HPF: ABNORMAL /HPF
BASOPHILS # BLD AUTO: 0.2 % (ref 0–1.8)
BASOPHILS # BLD: 0.02 K/UL (ref 0–0.12)
BILIRUB SERPL-MCNC: 0.3 MG/DL (ref 0.1–1.5)
BILIRUB UR QL STRIP.AUTO: NEGATIVE
BUN SERPL-MCNC: 11 MG/DL (ref 8–22)
CALCIUM SERPL-MCNC: 9.3 MG/DL (ref 8.5–10.5)
CHLORIDE SERPL-SCNC: 103 MMOL/L (ref 96–112)
CO2 SERPL-SCNC: 22 MMOL/L (ref 20–33)
COLOR UR: YELLOW
CREAT SERPL-MCNC: 0.83 MG/DL (ref 0.5–1.4)
EOSINOPHIL # BLD AUTO: 0.08 K/UL (ref 0–0.51)
EOSINOPHIL NFR BLD: 0.8 % (ref 0–6.9)
EPI CELLS #/AREA URNS HPF: ABNORMAL /HPF
ERYTHROCYTE [DISTWIDTH] IN BLOOD BY AUTOMATED COUNT: 44.7 FL (ref 35.9–50)
GLOBULIN SER CALC-MCNC: 3.1 G/DL (ref 1.9–3.5)
GLUCOSE SERPL-MCNC: 100 MG/DL (ref 65–99)
GLUCOSE UR STRIP.AUTO-MCNC: NEGATIVE MG/DL
HCT VFR BLD AUTO: 39.1 % (ref 37–47)
HGB BLD-MCNC: 13.1 G/DL (ref 12–16)
HYALINE CASTS #/AREA URNS LPF: ABNORMAL /LPF
IMM GRANULOCYTES # BLD AUTO: 0.03 K/UL (ref 0–0.11)
IMM GRANULOCYTES NFR BLD AUTO: 0.3 % (ref 0–0.9)
KETONES UR STRIP.AUTO-MCNC: ABNORMAL MG/DL
LEUKOCYTE ESTERASE UR QL STRIP.AUTO: ABNORMAL
LIPASE SERPL-CCNC: 24 U/L (ref 11–82)
LYMPHOCYTES # BLD AUTO: 2.38 K/UL (ref 1–4.8)
LYMPHOCYTES NFR BLD: 23.5 % (ref 22–41)
MCH RBC QN AUTO: 31.6 PG (ref 27–33)
MCHC RBC AUTO-ENTMCNC: 33.5 G/DL (ref 33.6–35)
MCV RBC AUTO: 94.2 FL (ref 81.4–97.8)
MICRO URNS: ABNORMAL
MONOCYTES # BLD AUTO: 0.6 K/UL (ref 0–0.85)
MONOCYTES NFR BLD AUTO: 5.9 % (ref 0–13.4)
NEUTROPHILS # BLD AUTO: 7 K/UL (ref 2–7.15)
NEUTROPHILS NFR BLD: 69.3 % (ref 44–72)
NITRITE UR QL STRIP.AUTO: NEGATIVE
NRBC # BLD AUTO: 0 K/UL
NRBC BLD-RTO: 0 /100 WBC
PH UR STRIP.AUTO: 5.5 [PH] (ref 5–8)
PLATELET # BLD AUTO: 247 K/UL (ref 164–446)
PMV BLD AUTO: 11.1 FL (ref 9–12.9)
POTASSIUM SERPL-SCNC: 3.9 MMOL/L (ref 3.6–5.5)
PROT SERPL-MCNC: 7.4 G/DL (ref 6–8.2)
PROT UR QL STRIP: NEGATIVE MG/DL
RBC # BLD AUTO: 4.15 M/UL (ref 4.2–5.4)
RBC # URNS HPF: ABNORMAL /HPF
RBC UR QL AUTO: NEGATIVE
SODIUM SERPL-SCNC: 137 MMOL/L (ref 135–145)
SP GR UR STRIP.AUTO: 1.03
UROBILINOGEN UR STRIP.AUTO-MCNC: 0.2 MG/DL
WBC # BLD AUTO: 10.1 K/UL (ref 4.8–10.8)
WBC #/AREA URNS HPF: ABNORMAL /HPF

## 2021-03-19 PROCEDURE — 83690 ASSAY OF LIPASE: CPT

## 2021-03-19 PROCEDURE — 81001 URINALYSIS AUTO W/SCOPE: CPT

## 2021-03-19 PROCEDURE — 87077 CULTURE AEROBIC IDENTIFY: CPT | Mod: 91

## 2021-03-19 PROCEDURE — 84702 CHORIONIC GONADOTROPIN TEST: CPT

## 2021-03-19 PROCEDURE — 85025 COMPLETE CBC W/AUTO DIFF WBC: CPT

## 2021-03-19 PROCEDURE — 87186 SC STD MICRODIL/AGAR DIL: CPT

## 2021-03-19 PROCEDURE — 99284 EMERGENCY DEPT VISIT MOD MDM: CPT

## 2021-03-19 PROCEDURE — 86901 BLOOD TYPING SEROLOGIC RH(D): CPT

## 2021-03-19 PROCEDURE — 76801 OB US < 14 WKS SINGLE FETUS: CPT

## 2021-03-19 PROCEDURE — 80053 COMPREHEN METABOLIC PANEL: CPT

## 2021-03-19 PROCEDURE — 87086 URINE CULTURE/COLONY COUNT: CPT

## 2021-03-19 ASSESSMENT — FIBROSIS 4 INDEX: FIB4 SCORE: .5517241379310344828

## 2021-03-19 NOTE — LETTER
3/23/2021               Crys Kwon  [address]  [place] [address]        Dear Crys (MR#[id number])    As we have been unable to contact you by phone, this letter is sent in regards to your recent visit to the [facility] Emergency Department on 3/19/2021.  During the visit, tests were performed to assist the physician in a medical diagnosis.  A review of those tests requires that we notify you of the following:    Your urine culture and sensitivity was POSITIVE for a bacteria called Escherichia coli, and further treatment may be necessary. IF YOU ARE NOT FEELING BETTER PLEASE CONTACT ME AS SOON AS POSSIBLE AT THE NUMBER BELOW.       Thank you for your cooperation in the matter.    Sincerely,  ED Culture Follow-Up Staff  Robin Singer, PharmD    [facility], Emergency Department  [address] [place] [id number] [phone] (ED Culture Line)  [phone]

## 2021-03-20 LAB
NUMBER OF RH DOSES IND 8505RD: NORMAL
RH BLD: NORMAL

## 2021-03-20 NOTE — ED PROVIDER NOTES
ED Provider Note    ED Provider Note    Scribed for Katie Lawson MD by Katie Lawson M.D.. 3/19/2021, 6:04 PM.    Primary care provider: No primary care provider on file.  Means of arrival: Private  History obtained from: Patient  History limited by: None    CHIEF COMPLAINT  Chief Complaint   Patient presents with   • Vaginal Bleeding   • Possible Pregnancy       HPI  Crys Kwon is a 35 y.o. female who presents to the Emergency Department for evaluation of abnormal vaginal bleeding.  Patient did not have a positive pregnancy test but does relate that she felt she had been pregnant.  Last menstrual period ended on 1 February.  Patient is G7, P4 83, she has had 3 miscarriages in the past few years.  She notes symptoms began this morning, describes spotting, cramping sensation as well to the bilateral low pelvis.  She notes some nausea for the last 2 weeks has had no vomiting.  No fever, no passage of large clots, no history of any acute trauma.  She is otherwise healthy, not anticoagulated.    REVIEW OF SYSTEMS  Pertinent positives include multiple miscarriages in medical history, pelvic cramping and abnormal vaginal bleeding. Pertinent negatives include no vomiting, no fever.  All other systems reviewed and negative.    PAST MEDICAL HISTORY   has a past medical history of Patient denies medical problems.    SURGICAL HISTORY  patient denies any surgical history    SOCIAL HISTORY  Social History     Tobacco Use   • Smoking status: Never Smoker   • Smokeless tobacco: Never Used   Substance Use Topics   • Alcohol use: Not Currently   • Drug use: Not Currently      Social History     Substance and Sexual Activity   Drug Use Not Currently       FAMILY HISTORY  History reviewed. No pertinent family history.  Noncontributory    CURRENT MEDICATIONS  Home Medications    **Home medications have not yet been reviewed for this encounter**         ALLERGIES  Allergies   Allergen Reactions   • Iodine    •  "Pcn [Penicillins]        PHYSICAL EXAM  VITAL SIGNS: /64   Pulse 79   Temp 36.4 °C (97.6 °F) (Temporal)   Resp 16   Ht 1.626 m (5' 4\")   Wt 80.7 kg (177 lb 14.6 oz)   LMP 02/01/2021   SpO2 98%   BMI 30.54 kg/m²     General: Alert, no acute distress  Skin: Warm, dry, normal for ethnicity  Head: Normocephalic, atraumatic  Neck: Trachea midline, no tenderness  Eye: PERRL, normal conjunctiva without pallor  ENMT: Oral mucosa moist, no pharyngeal erythema or exudate  Cardiovascular: Regular rate and rhythm, No murmur, Normal peripheral perfusion  Respiratory: Lungs CTA, respirations are non-labored, breath sounds are equal  Gastrointestinal: Soft, nontender, non distended.  Mild bilateral adnexal tenderness, no palpable transabdominal masses.  Musculoskeletal: No swelling, no deformity  Neurological: Alert and oriented to person, place, time, and situation  Lymphatics: No lymphadenopathy  Psychiatric: Cooperative, mildly anxious, otherwise appropriate mood & affect      DIAGNOSTIC STUDIES/PROCEDURES    LABS  Results for orders placed or performed during the hospital encounter of 03/19/21   CBC WITH DIFFERENTIAL   Result Value Ref Range    WBC 10.1 4.8 - 10.8 K/uL    RBC 4.15 (L) 4.20 - 5.40 M/uL    Hemoglobin 13.1 12.0 - 16.0 g/dL    Hematocrit 39.1 37.0 - 47.0 %    MCV 94.2 81.4 - 97.8 fL    MCH 31.6 27.0 - 33.0 pg    MCHC 33.5 (L) 33.6 - 35.0 g/dL    RDW 44.7 35.9 - 50.0 fL    Platelet Count 247 164 - 446 K/uL    MPV 11.1 9.0 - 12.9 fL    Neutrophils-Polys 69.30 44.00 - 72.00 %    Lymphocytes 23.50 22.00 - 41.00 %    Monocytes 5.90 0.00 - 13.40 %    Eosinophils 0.80 0.00 - 6.90 %    Basophils 0.20 0.00 - 1.80 %    Immature Granulocytes 0.30 0.00 - 0.90 %    Nucleated RBC 0.00 /100 WBC    Neutrophils (Absolute) 7.00 2.00 - 7.15 K/uL    Lymphs (Absolute) 2.38 1.00 - 4.80 K/uL    Monos (Absolute) 0.60 0.00 - 0.85 K/uL    Eos (Absolute) 0.08 0.00 - 0.51 K/uL    Baso (Absolute) 0.02 0.00 - 0.12 K/uL    " Immature Granulocytes (abs) 0.03 0.00 - 0.11 K/uL    NRBC (Absolute) 0.00 K/uL   COMP METABOLIC PANEL   Result Value Ref Range    Sodium 137 135 - 145 mmol/L    Potassium 3.9 3.6 - 5.5 mmol/L    Chloride 103 96 - 112 mmol/L    Co2 22 20 - 33 mmol/L    Anion Gap 12.0 7.0 - 16.0    Glucose 100 (H) 65 - 99 mg/dL    Bun 11 8 - 22 mg/dL    Creatinine 0.83 0.50 - 1.40 mg/dL    Calcium 9.3 8.5 - 10.5 mg/dL    AST(SGOT) 16 12 - 45 U/L    ALT(SGPT) 15 2 - 50 U/L    Alkaline Phosphatase 89 30 - 99 U/L    Total Bilirubin 0.3 0.1 - 1.5 mg/dL    Albumin 4.3 3.2 - 4.9 g/dL    Total Protein 7.4 6.0 - 8.2 g/dL    Globulin 3.1 1.9 - 3.5 g/dL    A-G Ratio 1.4 g/dL   LIPASE   Result Value Ref Range    Lipase 24 11 - 82 U/L   HCG QUANTITATIVE   Result Value Ref Range    Bhcg 70.1 (H) 0.0 - 5.0 mIU/mL   URINALYSIS,CULTURE IF INDICATED    Specimen: Urine, Clean Catch   Result Value Ref Range    Color Yellow     Character Clear     Specific Gravity 1.028 <1.035    Ph 5.5 5.0 - 8.0    Glucose Negative Negative mg/dL    Ketones Trace (A) Negative mg/dL    Protein Negative Negative mg/dL    Bilirubin Negative Negative    Urobilinogen, Urine 0.2 Negative    Nitrite Negative Negative    Leukocyte Esterase Trace (A) Negative    Occult Blood Negative Negative    Micro Urine Req Microscopic    RH TYPE FOR RHOGAM FROM E.D.   Result Value Ref Range    Emergency Department Rh Typing POS    URINE MICROSCOPIC (W/UA)   Result Value Ref Range    WBC 10-20 (A) /hpf    RBC 0-2 /hpf    Bacteria Few (A) None /hpf    Epithelial Cells Few /hpf    Hyaline Cast 6-10 (A) /lpf   URINE CULTURE(NEW)    Specimen: Urine   Result Value Ref Range    Significant Indicator NEG     Source UR     Site -     Culture Result -    ESTIMATED GFR   Result Value Ref Range    GFR If African American >60 >60 mL/min/1.73 m 2    GFR If Non African American >60 >60 mL/min/1.73 m 2     All labs reviewed by me,  B-HcG very low, otherwise unremarkable.        RADIOLOGY  US-OB 1ST  "TRIMESTER WITH TRANSVAGINAL (COMBO)   Final Result      1.  No intrauterine or extrauterine pregnancy demonstrated.   2.  Complex cystic structure in the RIGHT ovary, likely hemorrhagic corpus luteum.   3.  Ectopic pregnancy is not excluded. Recommend followup ultrasound and serial beta-hCG levels.   4.  Minimal free fluid present.              The radiologist's interpretation of all radiological studies have been reviewed by me.    COURSE & MEDICAL DECISION MAKING  Pertinent Labs & Imaging studies reviewed. (See chart for details)    6:04 PM - Patient seen and examined at bedside.  Ordered metabolic work-up including beta quant and Rh as well as pelvic ultrasound to evaluate her symptoms. The differential diagnoses include but are not limited to: Threatened , spontaneous , ectopic pregnancy    194: Patient reassessed, updated with ultrasound demonstrating no evidence of intrauterine pregnancy.  This is likely consistent with spontaneous .  Patient's beta hCG is very low.  I did note to the patient however I cannot 100% exclude ectopic pregnancy though it would be quite unlikely given this presentation.  She is amenable to return in 48 hours for repeat beta hCG and further assessment if indicated.    Patient Vitals for the past 24 hrs:   BP Temp Temp src Pulse Resp SpO2 Height Weight   21 1737 114/64 36.4 °C (97.6 °F) Temporal 79 16 98 % 1.626 m (5' 4\") 80.7 kg (177 lb 14.6 oz)         Decision Making:  This is a 35 y.o. year old female who presents with mild pelvic discomfort with vaginal bleeding described as spotting.  She is hemodynamically stable and otherwise well-appearing.  Given she has had multiple miscarriages in her medical history is certainly spontaneous  is high on the differential.  However she kaleb Duarte has been assessed by OB/GYN has had no imaging ultrasound is indicated to evaluate for potential ectopic pregnancy.    The patient will return for new or " worsening symptoms and is stable at the time of discharge.      DISPOSITION:  Patient will be discharged home in stable condition.    FOLLOW UP:  Rosamaria Corey M.D.  [address]  [place] [id number] [phone]    Schedule an appointment as soon as possible for a visit         OUTPATIENT MEDICATIONS:  New Prescriptions    No medications on file         FINAL IMPRESSION  1. Spontaneous miscarriage          Katie SNOWDEN M.D. (Scribe), am scribing for, and in the presence of, Katie Lawson MD.    Electronically signed by: Katie Lawson M.D. (Scribe), 3/19/2021    Katie SNOWDEN MD personally performed the services described in this documentation, as scribed by Katie Lawson M.D. in my presence, and it is both accurate and complete    The note accurately reflects work and decisions made by me.  Katie Lawson M.D.  3/19/2021  7:47 PM

## 2021-03-20 NOTE — DISCHARGE INSTRUCTIONS
Since we cannot 100% rule out ectopic pregnancy, I do recommend you return in as close as you can to 48 hours or see OB/GYN for repeat of your pregnancy hormone level and if needed additional studies.

## 2021-03-20 NOTE — ED NOTES
Patient discharged. Patient provided information about miscarriage. Patient educated to follow-up w/ OBGYN and to return to the ER w/ any worsening symptoms. Patient walked to the lobby.

## 2021-03-20 NOTE — ED TRIAGE NOTES
"Chief Complaint   Patient presents with   • Vaginal Bleeding   • Possible Pregnancy     34 yo female ambulatory to triage for above complaint. Pt reports she is possibly 7 wks pregnant, reports 4/10 generalized lower abdominal cramping and vaginal spotting since this AM, denies clots. VSS.    Educated on triage process, encourage to inform staff of any changes.     /64   Pulse 79   Temp 36.4 °C (97.6 °F) (Temporal)   Resp 16   Ht 1.626 m (5' 4\")   Wt 80.7 kg (177 lb 14.6 oz)   LMP 02/01/2021   SpO2 98%   BMI 30.54 kg/m²   "

## 2021-03-21 LAB
BACTERIA UR CULT: ABNORMAL
BACTERIA UR CULT: ABNORMAL
SIGNIFICANT IND 70042: ABNORMAL
SITE SITE: ABNORMAL
SOURCE SOURCE: ABNORMAL

## 2021-03-23 NOTE — PROGRESS NOTES
"ED Positive Culture Follow-up/Notification Note:   Date: 3/23/21 (late entry for 3/22/21).    Patient seen in the ED on 3/19/2021 for abnormal vaginal bleeding.   1. Spontaneous miscarriage      Discharge Medication List as of 3/19/2021  7:48 PM        Allergies: Iodine and Pcn [penicillins]    Vitals:    03/19/21 1737 03/19/21 1954   BP: 114/64 115/63   Pulse: 79 77   Resp: 16 16   Temp: 36.4 °C (97.6 °F) 36.8 °C (98.3 °F)   TempSrc: Temporal Temporal   SpO2: 98% 95%   Weight: 80.7 kg (177 lb 14.6 oz)    Height: 1.626 m (5' 4\")        Final cultures:   Results     Procedure Component Value Units Date/Time    URINE CULTURE(NEW) [[id number]]  (Abnormal)  (Susceptibility) Collected: 03/19/21 3806    Order Status: Completed Specimen: Urine Updated: 03/21/21 0935     Significant Indicator POS     Source UR     Site -     Culture Result Usual skin coleman 10-50,000 cfu/mL      Escherichia coli  10-50,000 cfu/mL      Narrative:      Indication for culture:->Pregnant women: fever and/or  asymptomatic screening    Susceptibility     Escherichia coli (1)     Antibiotic Interpretation Microscan Method Status    Amikacin [*]  Sensitive <=16 mcg/mL ZEE Final    Ampicillin Resistant >16 mcg/mL ZEE Final    Amoxicillin/CA [*]  Sensitive <=8/4 mcg/mL ZEE Final    Aztreonam [*]  Sensitive <=4 mcg/mL ZEE Final    Ceftolozane+Tazobactam [*]  Sensitive <=2 mcg/mL ZEE Final    Ceftriaxone Sensitive <=1 mcg/mL ZEE Final    Ceftazidime Sensitive <=1 mcg/mL ZEE Final    Cefotaxime Sensitive <=2 mcg/mL ZEE Final    Cefazolin Sensitive <=2 mcg/mL ZEE Final    Ciprofloxacin Sensitive <=0.25 mcg/mL ZEE Final    Cefepime Sensitive <=2 mcg/mL ZEE Final    Cefuroxime Sensitive <=4 mcg/mL ZEE Final    Ampicillin/sulbactam Sensitive 8/4 mcg/mL ZEE Final    Ceftazidime+Avibactam [*]  Sensitive <=4 mcg/mL ZEE Final    Tobramycin Sensitive <=2 mcg/mL ZEE Final    Ertapenem [*]  Sensitive <=0.5 mcg/mL ZEE Final    Nitrofurantoin Sensitive <=32 mcg/mL ZEE " Final    Gentamicin Sensitive <=2 mcg/mL ZEE Final    Imipenem [*]  Sensitive <=1 mcg/mL ZEE Final    Levofloxacin Sensitive <=0.5 mcg/mL ZEE Final    Meropenem [*]  Sensitive <=1 mcg/mL ZEE Final    Meropenem/Vaborbactam [*]  Sensitive <=2 mcg/mL ZEE Final    Pip/Tazobactam Sensitive <=8 mcg/mL ZEE Final    Trimeth/Sulfa Resistant >2/38 mcg/mL ZEE Final    Tetracycline [*]  Sensitive <=4 mcg/mL ZEE Final    Tigecycline Sensitive <=2 mcg/mL ZEE Final           [*]   Suppressed Antibiotic                 URINALYSIS,CULTURE IF INDICATED [[id number]]  (Abnormal) Collected: 03/19/21 1747    Order Status: Completed Specimen: Urine, Clean Catch Updated: 03/19/21 1857     Color Yellow     Character Clear     Specific Gravity 1.028     Ph 5.5     Glucose Negative mg/dL      Ketones Trace mg/dL      Protein Negative mg/dL      Bilirubin Negative     Urobilinogen, Urine 0.2     Nitrite Negative     Leukocyte Esterase Trace     Occult Blood Negative     Micro Urine Req Microscopic    Narrative:      Indication for culture:->Pregnant women: fever and/or  asymptomatic screening          Plan:   No therapy prescribed on discharge.  Called number listed and left a message encouraging her to return my call and included my call back number.  Will also send a letter to patient regarding findings of urine culture.  Depending on symptoms patient may require therapy, if so would recommend nitrofurantoin (Macrobid) 100 mg twice daily for 3 days.      Robin Singer, PharmD

## 2021-04-22 ENCOUNTER — TELEPHONE (OUTPATIENT)
Dept: SCHEDULING | Facility: IMAGING CENTER | Age: 36
End: 2021-04-22

## 2021-08-28 ENCOUNTER — APPOINTMENT (OUTPATIENT)
Dept: RADIOLOGY | Facility: MEDICAL CENTER | Age: 36
End: 2021-08-28
Attending: EMERGENCY MEDICINE
Payer: MEDICAID

## 2021-08-28 ENCOUNTER — HOSPITAL ENCOUNTER (EMERGENCY)
Facility: MEDICAL CENTER | Age: 36
End: 2021-08-28
Attending: EMERGENCY MEDICINE
Payer: MEDICAID

## 2021-08-28 VITALS
BODY MASS INDEX: 32.37 KG/M2 | HEART RATE: 89 BPM | SYSTOLIC BLOOD PRESSURE: 121 MMHG | RESPIRATION RATE: 20 BRPM | HEIGHT: 64 IN | TEMPERATURE: 97.2 F | WEIGHT: 189.6 LBS | OXYGEN SATURATION: 96 % | DIASTOLIC BLOOD PRESSURE: 72 MMHG

## 2021-08-28 DIAGNOSIS — N93.9 UTERINE BLEEDING: ICD-10-CM

## 2021-08-28 LAB
ALBUMIN SERPL BCP-MCNC: 4.4 G/DL (ref 3.2–4.9)
ALBUMIN/GLOB SERPL: 1.4 G/DL
ALP SERPL-CCNC: 105 U/L (ref 30–99)
ALT SERPL-CCNC: 16 U/L (ref 2–50)
ANION GAP SERPL CALC-SCNC: 9 MMOL/L (ref 7–16)
APPEARANCE UR: CLEAR
AST SERPL-CCNC: 16 U/L (ref 12–45)
B-HCG SERPL-ACNC: <1 MIU/ML (ref 0–5)
BASOPHILS # BLD AUTO: 0.4 % (ref 0–1.8)
BASOPHILS # BLD: 0.03 K/UL (ref 0–0.12)
BILIRUB SERPL-MCNC: <0.2 MG/DL (ref 0.1–1.5)
BILIRUB UR QL STRIP.AUTO: NEGATIVE
BUN SERPL-MCNC: 14 MG/DL (ref 8–22)
CALCIUM SERPL-MCNC: 9.4 MG/DL (ref 8.5–10.5)
CHLORIDE SERPL-SCNC: 103 MMOL/L (ref 96–112)
CO2 SERPL-SCNC: 27 MMOL/L (ref 20–33)
COLOR UR: NORMAL
CREAT SERPL-MCNC: 0.76 MG/DL (ref 0.5–1.4)
EOSINOPHIL # BLD AUTO: 0.09 K/UL (ref 0–0.51)
EOSINOPHIL NFR BLD: 1.1 % (ref 0–6.9)
ERYTHROCYTE [DISTWIDTH] IN BLOOD BY AUTOMATED COUNT: 42.7 FL (ref 35.9–50)
GLOBULIN SER CALC-MCNC: 3.2 G/DL (ref 1.9–3.5)
GLUCOSE SERPL-MCNC: 95 MG/DL (ref 65–99)
GLUCOSE UR STRIP.AUTO-MCNC: NEGATIVE MG/DL
HCT VFR BLD AUTO: 39.3 % (ref 37–47)
HGB BLD-MCNC: 13.2 G/DL (ref 12–16)
IMM GRANULOCYTES # BLD AUTO: 0.02 K/UL (ref 0–0.11)
IMM GRANULOCYTES NFR BLD AUTO: 0.2 % (ref 0–0.9)
KETONES UR STRIP.AUTO-MCNC: NEGATIVE MG/DL
LEUKOCYTE ESTERASE UR QL STRIP.AUTO: NEGATIVE
LYMPHOCYTES # BLD AUTO: 3.3 K/UL (ref 1–4.8)
LYMPHOCYTES NFR BLD: 40.8 % (ref 22–41)
MCH RBC QN AUTO: 31.4 PG (ref 27–33)
MCHC RBC AUTO-ENTMCNC: 33.6 G/DL (ref 33.6–35)
MCV RBC AUTO: 93.3 FL (ref 81.4–97.8)
MICRO URNS: NORMAL
MONOCYTES # BLD AUTO: 0.42 K/UL (ref 0–0.85)
MONOCYTES NFR BLD AUTO: 5.2 % (ref 0–13.4)
NEUTROPHILS # BLD AUTO: 4.23 K/UL (ref 2–7.15)
NEUTROPHILS NFR BLD: 52.3 % (ref 44–72)
NITRITE UR QL STRIP.AUTO: NEGATIVE
NRBC # BLD AUTO: 0 K/UL
NRBC BLD-RTO: 0 /100 WBC
NUMBER OF RH DOSES IND 8505RD: NORMAL
PH UR STRIP.AUTO: 6 [PH] (ref 5–8)
PLATELET # BLD AUTO: 259 K/UL (ref 164–446)
PMV BLD AUTO: 10.8 FL (ref 9–12.9)
POTASSIUM SERPL-SCNC: 4 MMOL/L (ref 3.6–5.5)
PROT SERPL-MCNC: 7.6 G/DL (ref 6–8.2)
PROT UR QL STRIP: NEGATIVE MG/DL
RBC # BLD AUTO: 4.21 M/UL (ref 4.2–5.4)
RBC UR QL AUTO: NEGATIVE
RH BLD: NORMAL
SODIUM SERPL-SCNC: 139 MMOL/L (ref 135–145)
SP GR UR STRIP.AUTO: 1.03
UROBILINOGEN UR STRIP.AUTO-MCNC: 0.2 MG/DL
WBC # BLD AUTO: 8.1 K/UL (ref 4.8–10.8)

## 2021-08-28 PROCEDURE — 81003 URINALYSIS AUTO W/O SCOPE: CPT

## 2021-08-28 PROCEDURE — 99284 EMERGENCY DEPT VISIT MOD MDM: CPT

## 2021-08-28 PROCEDURE — 80053 COMPREHEN METABOLIC PANEL: CPT

## 2021-08-28 PROCEDURE — 76801 OB US < 14 WKS SINGLE FETUS: CPT

## 2021-08-28 PROCEDURE — 36415 COLL VENOUS BLD VENIPUNCTURE: CPT

## 2021-08-28 PROCEDURE — 84702 CHORIONIC GONADOTROPIN TEST: CPT

## 2021-08-28 PROCEDURE — 86901 BLOOD TYPING SEROLOGIC RH(D): CPT

## 2021-08-28 PROCEDURE — 85025 COMPLETE CBC W/AUTO DIFF WBC: CPT

## 2021-08-28 ASSESSMENT — FIBROSIS 4 INDEX: FIB4 SCORE: 0.6

## 2021-08-28 NOTE — ED TRIAGE NOTES
Chief Complaint   Patient presents with   • Vaginal Bleeding     pt started spotting this evening denies abd cramping      pt has had 2 positive at home pregnancy tests. Pt states she is anxious because she has had 2 miscarriages earlier this year.

## 2021-08-28 NOTE — ED NOTES
Discharge instructions and follow-up reviewed with pt. Pt verbalized understanding. Denies questions at this time. Pt ambulatory at discharge.

## 2021-08-28 NOTE — ED PROVIDER NOTES
"ED Provider Note    CHIEF COMPLAINT  Chief Complaint   Patient presents with   • Vaginal Bleeding     pt started spotting this evening denies abd cramping        HPI  Crys Kwon is a 35 y.o. female who presents to the emergency department through triage for evaluation of vaginal bleeding.  Patient states she started spotting yesterday afternoon.  She is having some intermittent low abdominal cramping.  Positive home pregnancy test 2 days ago.      This would be G7, P4, SAB 2 most recently in March 2021.  LMP 7/26.  Has an appointment to see Dr. Rodriguez next week.    REVIEW OF SYSTEMS  See HPI for further details. All other systems are negative.     PAST MEDICAL HISTORY   has a past medical history of Patient denies medical problems.    SOCIAL HISTORY  Social History     Tobacco Use   • Smoking status: Never Smoker   • Smokeless tobacco: Never Used   Vaping Use   • Vaping Use: Never used   Substance and Sexual Activity   • Alcohol use: Not Currently   • Drug use: Not Currently   • Sexual activity: Not on file       SURGICAL HISTORY  patient denies any surgical history    CURRENT MEDICATIONS  Home Medications     Reviewed by Lelo Call R.N. (Registered Nurse) on 08/28/21 at 0301  Med List Status: Not Addressed   Medication Last Dose Status   aspirin (ASA) 81 MG Chew Tab chewable tablet  Active   progesterone (PROMETRIUM) 100 MG Cap  Active                ALLERGIES  Allergies   Allergen Reactions   • Iodine    • Pcn [Penicillins]        PHYSICAL EXAM  VITAL SIGNS: /78   Pulse 75   Temp 36.2 °C (97.1 °F) (Temporal)   Resp 20   Ht 1.626 m (5' 4\")   Wt 86 kg (189 lb 9.5 oz)   LMP 07/29/2021 (Approximate)   SpO2 96%   BMI 32.54 kg/m²   Pulse ox interpretation: I interpret this pulse ox as normal.  Constitutional: Alert in no apparent distress.  HENT: Normocephalic, atraumatic. Bilateral external ears normal, Nose normal.   Eyes: Conjunctiva normal.   Neck: Normal range of motion  Cardiovascular: Regular " rate and rhythm, no murmurs. Distal pulses intact.    Thorax & Lungs: Normal breath sounds.  No wheezing/rales/ronchi. No increased work of breathing  Abdomen: Soft, non-distended, non-tender to palpation.  No palpable fundus.  Skin: Warm, Dry, No erythema, No rash.   Musculoskeletal: Good range of motion in all major joints.   Neurologic: Alert and orient x4.  Ambulates independently.  Psychiatric: Affect normal, Judgment normal, Mood normal.       DIAGNOSTIC STUDIES / PROCEDURES      LABS  Results for orders placed or performed during the hospital encounter of 08/28/21   CBC WITH DIFFERENTIAL   Result Value Ref Range    WBC 8.1 4.8 - 10.8 K/uL    RBC 4.21 4.20 - 5.40 M/uL    Hemoglobin 13.2 12.0 - 16.0 g/dL    Hematocrit 39.3 37.0 - 47.0 %    MCV 93.3 81.4 - 97.8 fL    MCH 31.4 27.0 - 33.0 pg    MCHC 33.6 33.6 - 35.0 g/dL    RDW 42.7 35.9 - 50.0 fL    Platelet Count 259 164 - 446 K/uL    MPV 10.8 9.0 - 12.9 fL    Neutrophils-Polys 52.30 44.00 - 72.00 %    Lymphocytes 40.80 22.00 - 41.00 %    Monocytes 5.20 0.00 - 13.40 %    Eosinophils 1.10 0.00 - 6.90 %    Basophils 0.40 0.00 - 1.80 %    Immature Granulocytes 0.20 0.00 - 0.90 %    Nucleated RBC 0.00 /100 WBC    Neutrophils (Absolute) 4.23 2.00 - 7.15 K/uL    Lymphs (Absolute) 3.30 1.00 - 4.80 K/uL    Monos (Absolute) 0.42 0.00 - 0.85 K/uL    Eos (Absolute) 0.09 0.00 - 0.51 K/uL    Baso (Absolute) 0.03 0.00 - 0.12 K/uL    Immature Granulocytes (abs) 0.02 0.00 - 0.11 K/uL    NRBC (Absolute) 0.00 K/uL   COMP METABOLIC PANEL   Result Value Ref Range    Sodium 139 135 - 145 mmol/L    Potassium 4.0 3.6 - 5.5 mmol/L    Chloride 103 96 - 112 mmol/L    Co2 27 20 - 33 mmol/L    Anion Gap 9.0 7.0 - 16.0    Glucose 95 65 - 99 mg/dL    Bun 14 8 - 22 mg/dL    Creatinine 0.76 0.50 - 1.40 mg/dL    Calcium 9.4 8.5 - 10.5 mg/dL    AST(SGOT) 16 12 - 45 U/L    ALT(SGPT) 16 2 - 50 U/L    Alkaline Phosphatase 105 (H) 30 - 99 U/L    Total Bilirubin <0.2 0.1 - 1.5 mg/dL    Albumin 4.4  3.2 - 4.9 g/dL    Total Protein 7.6 6.0 - 8.2 g/dL    Globulin 3.2 1.9 - 3.5 g/dL    A-G Ratio 1.4 g/dL   HCG QUANTITATIVE SERUM   Result Value Ref Range    Bhcg <1.0 0.0 - 5.0 mIU/mL   RH TYPE FOR RHOGAM FROM E.D.   Result Value Ref Range    Emergency Department Rh Typing POS     Number Of Rh Doses Indicated ZERO    URINALYSIS (UA)    Specimen: Urine   Result Value Ref Range    Color DK Yellow     Character Clear     Specific Gravity 1.026 <1.035    Ph 6.0 5.0 - 8.0    Glucose Negative Negative mg/dL    Ketones Negative Negative mg/dL    Protein Negative Negative mg/dL    Bilirubin Negative Negative    Urobilinogen, Urine 0.2 Negative    Nitrite Negative Negative    Leukocyte Esterase Negative Negative    Occult Blood Negative Negative    Micro Urine Req see below    ESTIMATED GFR   Result Value Ref Range    GFR If African American >60 >60 mL/min/1.73 m 2    GFR If Non African American >60 >60 mL/min/1.73 m 2       RADIOLOGY  US-OB 1ST TRIMESTER WITH TRANSVAGINAL (COMBO)   Final Result      1.  No intrauterine or extrauterine pregnancy demonstrated.   2.  If the patient patient is indeed pregnant, then ectopic pregnancy would not be excluded and follow up ultrasound and beta hCG recommended.   3.  Minimal free fluid, nonspecific.          COURSE & MEDICAL DECISION MAKING  Evaluation for abdominal pain, vaginal bleeding and reported pregnancy is otherwise unrevealing.  It does not appear the patient is pregnant, beta quant less than 1.  Ultrasound without evidence for intrauterine pregnancy or other gross adnexal mass or pathology.  Rh+, no indication for RhoGam.  Patient is counseled regarding results, and encouraged to follow-up with Dr. Centeno as scheduled next week.    Patient is stable for discharge at this time, anticipatory guidance provided, close follow-up is encouraged, and strict ED return instructions have been detailed. Patient is agreeable to the disposition and plan.    Patient's blood pressure was  elevated in the emergency department, and has been referred to primary care for close monitoring.      FINAL IMPRESSION  (N93.9) Uterine bleeding      Electronically signed by: Daniela Zhou D.O., 8/28/2021 7:57 AM      This dictation was created using voice recognition software. The accuracy of the dictation is limited to the abilities of the software. I expect there may be some errors of grammar and possibly content. The nursing notes were reviewed and certain aspects of this information were incorporated into this note.

## 2021-08-28 NOTE — ED NOTES
Patient ambulated from the [facility] to [facility] 23 independently with steady gait.  Patient changed into gown and placed on monitor, chart up for ERP.

## 2021-08-28 NOTE — DISCHARGE INSTRUCTIONS
Follow-up with Dr. Rodriguez as scheduled next week for reevaluation.    Your work-up today does not indicate that you are currently pregnant.    Return to the emergency department for persistent vaginal bleeding (more than 1 pad per hour for 4 to 5 hours), abdominal pain, syncope, fever, vomiting or other new concerns.

## 2021-08-30 ENCOUNTER — HOSPITAL ENCOUNTER (EMERGENCY)
Facility: MEDICAL CENTER | Age: 36
End: 2021-08-30
Attending: EMERGENCY MEDICINE | Admitting: EMERGENCY MEDICINE
Payer: MEDICAID

## 2021-08-30 VITALS
RESPIRATION RATE: 16 BRPM | WEIGHT: 187.17 LBS | HEART RATE: 90 BPM | SYSTOLIC BLOOD PRESSURE: 117 MMHG | HEIGHT: 64 IN | OXYGEN SATURATION: 95 % | DIASTOLIC BLOOD PRESSURE: 84 MMHG | BODY MASS INDEX: 31.95 KG/M2 | TEMPERATURE: 97.4 F

## 2021-08-30 DIAGNOSIS — T74.21XA SEXUAL ASSAULT OF ADULT, INITIAL ENCOUNTER: ICD-10-CM

## 2021-08-30 PROCEDURE — 99281 EMR DPT VST MAYX REQ PHY/QHP: CPT

## 2021-08-30 ASSESSMENT — ENCOUNTER SYMPTOMS: ABDOMINAL PAIN: 0

## 2021-08-30 ASSESSMENT — FIBROSIS 4 INDEX: FIB4 SCORE: 0.56

## 2021-08-30 NOTE — ED NOTES
[employer] at bedside. ERP recently at bedside updating patient on pending disposition and plan of care. All questions answered.
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
## 2021-08-30 NOTE — ED NOTES
Dayne SENA departs bedside, provides patient with all information for appointment this am with [facility] team. Patient discharge instructions reviewed, instructed on follow up, verbalizes understanding, denies any additional needs or questions. Ambulates out of department with steady, upright gait.

## 2022-04-30 PROCEDURE — 36415 COLL VENOUS BLD VENIPUNCTURE: CPT

## 2022-04-30 PROCEDURE — 99284 EMERGENCY DEPT VISIT MOD MDM: CPT

## 2022-04-30 ASSESSMENT — FIBROSIS 4 INDEX: FIB4 SCORE: .5714285714285714286

## 2022-05-01 ENCOUNTER — APPOINTMENT (OUTPATIENT)
Dept: RADIOLOGY | Facility: MEDICAL CENTER | Age: 37
End: 2022-05-01
Attending: EMERGENCY MEDICINE
Payer: MEDICAID

## 2022-05-01 ENCOUNTER — HOSPITAL ENCOUNTER (EMERGENCY)
Facility: MEDICAL CENTER | Age: 37
End: 2022-05-01
Attending: EMERGENCY MEDICINE
Payer: MEDICAID

## 2022-05-01 VITALS
WEIGHT: 202.38 LBS | RESPIRATION RATE: 16 BRPM | TEMPERATURE: 98.1 F | DIASTOLIC BLOOD PRESSURE: 75 MMHG | OXYGEN SATURATION: 97 % | SYSTOLIC BLOOD PRESSURE: 115 MMHG | BODY MASS INDEX: 34.55 KG/M2 | HEART RATE: 77 BPM | HEIGHT: 64 IN

## 2022-05-01 DIAGNOSIS — O36.80X0 PREGNANCY OF UNKNOWN ANATOMIC LOCATION: ICD-10-CM

## 2022-05-01 LAB
ALBUMIN SERPL BCP-MCNC: 4.5 G/DL (ref 3.2–4.9)
ALBUMIN/GLOB SERPL: 1.6 G/DL
ALP SERPL-CCNC: 103 U/L (ref 30–99)
ALT SERPL-CCNC: 51 U/L (ref 2–50)
ANION GAP SERPL CALC-SCNC: 12 MMOL/L (ref 7–16)
AST SERPL-CCNC: 40 U/L (ref 12–45)
B-HCG SERPL-ACNC: ABNORMAL MIU/ML (ref 0–5)
BASOPHILS # BLD AUTO: 0.3 % (ref 0–1.8)
BASOPHILS # BLD: 0.03 K/UL (ref 0–0.12)
BILIRUB SERPL-MCNC: <0.2 MG/DL (ref 0.1–1.5)
BUN SERPL-MCNC: 12 MG/DL (ref 8–22)
CALCIUM SERPL-MCNC: 9.7 MG/DL (ref 8.5–10.5)
CHLORIDE SERPL-SCNC: 100 MMOL/L (ref 96–112)
CO2 SERPL-SCNC: 25 MMOL/L (ref 20–33)
CREAT SERPL-MCNC: 0.79 MG/DL (ref 0.5–1.4)
EOSINOPHIL # BLD AUTO: 0.08 K/UL (ref 0–0.51)
EOSINOPHIL NFR BLD: 0.9 % (ref 0–6.9)
ERYTHROCYTE [DISTWIDTH] IN BLOOD BY AUTOMATED COUNT: 46.5 FL (ref 35.9–50)
GFR SERPLBLD CREATININE-BSD FMLA CKD-EPI: 98 ML/MIN/1.73 M 2
GLOBULIN SER CALC-MCNC: 2.8 G/DL (ref 1.9–3.5)
GLUCOSE SERPL-MCNC: 89 MG/DL (ref 65–99)
HCT VFR BLD AUTO: 39.7 % (ref 37–47)
HGB BLD-MCNC: 13.4 G/DL (ref 12–16)
IMM GRANULOCYTES # BLD AUTO: 0.04 K/UL (ref 0–0.11)
IMM GRANULOCYTES NFR BLD AUTO: 0.5 % (ref 0–0.9)
LYMPHOCYTES # BLD AUTO: 3.03 K/UL (ref 1–4.8)
LYMPHOCYTES NFR BLD: 34.4 % (ref 22–41)
MCH RBC QN AUTO: 31.9 PG (ref 27–33)
MCHC RBC AUTO-ENTMCNC: 33.8 G/DL (ref 33.6–35)
MCV RBC AUTO: 94.5 FL (ref 81.4–97.8)
MONOCYTES # BLD AUTO: 0.51 K/UL (ref 0–0.85)
MONOCYTES NFR BLD AUTO: 5.8 % (ref 0–13.4)
NEUTROPHILS # BLD AUTO: 5.13 K/UL (ref 2–7.15)
NEUTROPHILS NFR BLD: 58.1 % (ref 44–72)
NRBC # BLD AUTO: 0 K/UL
NRBC BLD-RTO: 0 /100 WBC
NUMBER OF RH DOSES IND 8505RD: NORMAL
PLATELET # BLD AUTO: 260 K/UL (ref 164–446)
PMV BLD AUTO: 11.1 FL (ref 9–12.9)
POTASSIUM SERPL-SCNC: 4 MMOL/L (ref 3.6–5.5)
PROT SERPL-MCNC: 7.3 G/DL (ref 6–8.2)
RBC # BLD AUTO: 4.2 M/UL (ref 4.2–5.4)
RH BLD: NORMAL
SODIUM SERPL-SCNC: 137 MMOL/L (ref 135–145)
WBC # BLD AUTO: 8.8 K/UL (ref 4.8–10.8)

## 2022-05-01 PROCEDURE — 84702 CHORIONIC GONADOTROPIN TEST: CPT

## 2022-05-01 PROCEDURE — 85025 COMPLETE CBC W/AUTO DIFF WBC: CPT

## 2022-05-01 PROCEDURE — 80053 COMPREHEN METABOLIC PANEL: CPT

## 2022-05-01 PROCEDURE — 86901 BLOOD TYPING SEROLOGIC RH(D): CPT

## 2022-05-01 PROCEDURE — 76801 OB US < 14 WKS SINGLE FETUS: CPT

## 2022-05-01 NOTE — ED PROVIDER NOTES
ED Provider Note    Scribed for Jeff Gomez M.D. by Juliet Ji. 5/1/2022  12:37 AM    Primary care provider: No primary care provider.  Means of arrival: Walk in  History obtained from: Patient  History limited by: None    CHIEF COMPLAINT  Chief Complaint   Patient presents with   • Pregnancy     Approx 6 weeks pregnant   • Abdominal Pain     Intermittent cramping   • Vaginal Bleeding     Pt reports noticing blood spotting on bed sheets. Has not had to use pads or liners       HPI  Crys Kwon is a 36 y.o. female who is approximately 6 weeks pregnant and presents to the Emergency Department for constant vaginal bleeding and mild abdominal pain. Per the patient, she initially just had spotting for the past couple of days before developing a cramping sensation along her lower abdomen earlier yesterday. She states she has had 3 miscarriages in the past 2 years, all of which have occurred around the 7 week don; she has 4 living children. The patient denies dysuria, recent accidents or injuries, or loss of consciousness. No exacerbating or alleviating factors were noted. She is not on blood thinners and [facility] with her last appointment being around 3/28/22 and her next one being 5/11/22.     REVIEW OF SYSTEMS  Pertinent positives include: abdominal pain, vaginal bleeding. Pertinent negatives include: dysuria, accidents or injuries, or loss of consciousness. See history of present illness. All other systems are negative.     PAST MEDICAL HISTORY   has a past medical history of Patient denies medical problems.    SURGICAL HISTORY  patient denies any surgical history    SOCIAL HISTORY  Social History     Tobacco Use   • Smoking status: Never Smoker   • Smokeless tobacco: Never Used   Vaping Use   • Vaping Use: Never used   Substance Use Topics   • Alcohol use: Not Currently     Comment: occ   • Drug use: Not Currently      Social History     Substance and Sexual Activity  "  Drug Use Not Currently       FAMILY HISTORY  History reviewed. No pertinent family history.    CURRENT MEDICATIONS  Home Medications     Reviewed by Maria Eugenia Matos R.N. (Registered Nurse) on 05/01/22 at 0001  Med List Status: Partial   Medication Last Dose Status   aspirin (ASA) 81 MG Chew Tab chewable tablet  Active   progesterone (PROMETRIUM) 100 MG Cap  Active                ALLERGIES  Allergies   Allergen Reactions   • Iodine    • Pcn [Penicillins]        PHYSICAL EXAM  VITAL SIGNS: /79   Pulse 86   Temp 36.5 °C (97.7 °F) (Temporal)   Resp 16   Ht 1.626 m (5' 4\")   Wt 91.8 kg (202 lb 6.1 oz)   LMP 03/24/2022 (Approximate)   SpO2 100%   BMI 34.74 kg/m²     Constitutional: Alert in no apparent distress.  HENT: No signs of trauma, Bilateral external ears normal, Nose normal. Uvula midline.   Eyes: Pupils are equal and reactive, Conjunctiva normal, Non-icteric.   Neck: Normal range of motion, No tenderness, Supple, No stridor.   Lymphatic: No lymphadenopathy noted.   Cardiovascular: Regular rate and rhythm, no murmurs.   Thorax & Lungs: Normal breath sounds, No respiratory distress, No wheezing, No chest tenderness.   Abdomen:  Soft, Mild diffuse lower abdominal tenderness to palpation, No peritoneal signs, No masses, No pulsatile masses.   Skin: Warm, Dry, No erythema, No rash.   Back: No bony tenderness, No CVA tenderness.   Extremities: Intact distal pulses, No edema, No tenderness, No cyanosis.  Musculoskeletal: Good range of motion in all major joints. No tenderness to palpation or major deformities noted.   Neurologic: Alert , Normal motor function, Normal sensory function, No focal deficits noted.   Psychiatric: Affect normal, Judgment normal, Mood normal.     DIAGNOSTIC STUDIES / PROCEDURES    LABS  Labs Reviewed   COMP METABOLIC PANEL - Abnormal; Notable for the following components:       Result Value    ALT(SGPT) 51 (*)     Alkaline Phosphatase 103 (*)     All other components within " normal limits   HCG QUANTITATIVE - Abnormal; Notable for the following components:    Bhcg 17561.0 (*)     All other components within normal limits   CBC WITH DIFFERENTIAL   RH TYPE FOR RHOGAM FROM E.D.    Narrative:     Print Consent?->No   ESTIMATED GFR   URINALYSIS,CULTURE IF INDICATED      All labs reviewed by me.    RADIOLOGY  US-OB 1ST TRIMESTER WITH TRANSVAGINAL (COMBO)   Final Result            1. Intrauterine gestational sac of approximately 6 weeks size with yolk sac but no embryo identified. The findings could relate to probable early intrauterine pregnancy.      Continued follow-up of serum beta HCG levels and repeat ultrasound is recommended.           The radiologist's interpretation of all radiological studies have been reviewed by me.    COURSE & MEDICAL DECISION MAKING  Nursing notes, VS, PMSFHx reviewed in chart.    36 y.o. female p/w chief complaint of abdominal cramping and vaginal bleeding.    12:37 AM Patient seen and examined at bedside. Ordered US-OB transvaginal, CBC w/diff, CMP, RH type, HCG quantitative, and UA to evaluate.     I verified that the patient was wearing a mask and I was wearing appropriate PPE every time I entered the room. The patient's mask was on the patient at all times during my encounter except for a brief view of the oropharynx.     The differential diagnoses include but are not limited to:     36 y.o. F Rh+ A3 ~6 weeks GA by ultrasound p/w vaginal bleeding.     DDx includes but is not limited to :  H&P concerning for threatened miscarriage.  Normal VS, doubt acute blood loss anemia.  US shows gestational sac of 6 weeks but no embryo identified and HCG quant 95793, unable to r/o ectopic pregnancy.   Rh+, doubt alloimmunization.  No dysuria, doubt UTI.  Pt w/ no e/o clinically significant blood loss anemia, pt denies syncope or persistent lightheadedness.  Pt is not anticoagulated and has no family hx of bleeding d/o.     Pt to f/u w/ PCP or ED w/ repeat HCG in 2  days or to return if heavy bleeding starts or further concerns  OB/GYN consulted, Dr. Rodriguez will see the pt on 5/2/22. Pt counseled on return precautions.     2:00 AM Informed by RN the patient is refusing to give UA at this time and doesn't believe the test is necessary. Patient reminded by RN that urine sample is needed to help form potential diagnoses but the patient continued to refuse to provide a sample.  Cannot rule out UTI or asymptomatic bacteriuria at this time and patient aware of potential ramifications of this including transmission of infection to fetus and systemic illness.      2:25 AM I discussed the patient's case and the above findings with Dr. Rodriguez (OB/GYN) who requested the patient follow up in clinic tomorrow.     2:28 AM Patient re-evaluated at bedside. Patient reports feeling no pain. Discussed the patient's condition and treatment plan, including my discussion with Dr. Rodriguez and plans for discharge. Patient's labs and radiology results discussed. Gave discharge instructions and return precautions. The patient understood and is comfortable with discharge. At this time, the patient was given the opportunity to ask questions.     The patient will return for new or worsening symptoms and is stable at the time of discharge.    The patient is referred to a primary physician for blood pressure management, diabetic screening, and for all other preventative health concerns.    DISPOSITION:  Patient will be discharged home in stable condition.    FOLLOW UP:  [facility], Emergency Dept  [address] [id number] [phone]    If symptoms worsen    Tony Rodriguez M.D.  [address]  [address]  [facility] [id number] [phone]    In 2 days  for repeat hcg testing    Pregnancy Scooter Kramer M.D.  [address]  [facility] [address]  [phone]      please return in 48 hours for repeat blood testing, hcg    FINAL IMPRESSION  1. Pregnancy of unknown anatomic  location          Juliet SNOWDEN (Scribe), am scribing for, and in the presence of, Jeff Gomez M.D..    Electronically signed by: Juliet Ji (Júnioribdorinda), 5/1/2022    IJeff M.D. personally performed the services described in this documentation, as scribed by Juliet Ji in my presence, and it is both accurate and complete.    C    The note accurately reflects work and decisions made by me.  Jeff Gomez M.D.  5/1/2022  7:08 AM

## 2022-05-01 NOTE — ED NOTES
Pt disconnected self from monitor and changed back into street clothes. RN asked pt if she would be able to provide urine sample, pt refused stating she did not think it was necessary. Attempted to re-educate pt on the importance of a urine sample for diagnosis and need for continuous monitoring while in the ED without success. Pt sitting in chair, no acute distress noted. Waiting on lab results.

## 2022-05-01 NOTE — ED NOTES
Pt provided discharge instructions and follow-up information. Pt verbalized understanding and all questions answered. Pt ambulated from ED with steady gait carrying all belongings.

## 2022-05-01 NOTE — ED TRIAGE NOTES
Chief Complaint   Patient presents with   • Pregnancy     Approx 6 weeks pregnant   • Abdominal Pain     Intermittent cramping   • Vaginal Bleeding     Pt reports noticing blood spotting on bed sheets. Has not had to use pads or liners     Pt ambulates to triage with the above complaint    Pt reports having 3 previous miscarriages around the 7 week don   2020, 3/2021, and 2021.    Pt is currently approx 6 weeks pregnant per a home pregnancy test and experiencing morning sickness.         Protocol ordered. Pt to [place] for lab draw. Explained triage process and to notify staff of any changes.

## 2022-05-01 NOTE — ED NOTES
Lab called for update on CBC,  states it was never put into process of their end but will run it now.

## 2022-05-01 NOTE — DISCHARGE INSTRUCTIONS
Please discuss the following findings with your GYN doctor:  Labs Reviewed   COMP METABOLIC PANEL - Abnormal; Notable for the following components:       Result Value    ALT(SGPT) 51 (*)     Alkaline Phosphatase 103 (*)     All other components within normal limits   HCG QUANTITATIVE - Abnormal; Notable for the following components:    Bhcg 16221.0 (*)     All other components within normal limits   RH TYPE FOR RHOGAM FROM E.D.    Narrative:     Print Consent?->No   ESTIMATED GFR   CBC WITH DIFFERENTIAL   URINALYSIS,CULTURE IF INDICATED      US-OB 1ST TRIMESTER WITH TRANSVAGINAL (COMBO)   Final Result            1. Intrauterine gestational sac of approximately 6 weeks size with yolk sac but no embryo identified. The findings could relate to probable early intrauterine pregnancy.      Continued follow-up of serum beta HCG levels and repeat ultrasound is recommended.          Today we can not rule out the possibility of pregnancy in the wrong location or ectopic pregnancy.

## 2022-05-01 NOTE — ED NOTES
Pt ambulatory to room and changed into a gown. Pt states she does not want an IV. Pt educated on need for urine sample as well.

## 2022-05-04 DIAGNOSIS — Z34.90 EARLY STAGE OF PREGNANCY: ICD-10-CM

## 2022-05-05 ENCOUNTER — TELEPHONE (OUTPATIENT)
Dept: OBGYN | Facility: CLINIC | Age: 37
End: 2022-05-05

## 2022-05-05 ENCOUNTER — TELEPHONE (OUTPATIENT)
Dept: OBGYN | Facility: CLINIC | Age: 37
End: 2022-05-05
Payer: MEDICAID

## 2022-05-05 ENCOUNTER — HOSPITAL ENCOUNTER (OUTPATIENT)
Dept: LAB | Facility: MEDICAL CENTER | Age: 37
End: 2022-05-05
Attending: OBSTETRICS & GYNECOLOGY
Payer: MEDICAID

## 2022-05-05 DIAGNOSIS — O36.80X0 PREGNANCY, LOCATION UNKNOWN: ICD-10-CM

## 2022-05-05 LAB — B-HCG SERPL-ACNC: ABNORMAL MIU/ML (ref 0–5)

## 2022-05-05 PROCEDURE — 36415 COLL VENOUS BLD VENIPUNCTURE: CPT

## 2022-05-05 PROCEDURE — 84702 CHORIONIC GONADOTROPIN TEST: CPT

## 2022-05-05 NOTE — TELEPHONE ENCOUNTER
Aaliyah Bell from [facility] called stating pt's order for HCG is wrong, it should be Quant and not Qual. Informed Aaliyah Nettles is out of the office at this moment but will talk to her as soon as she comes back.  Order pended and sent to Dr. Dominguez

## 2022-05-13 ENCOUNTER — APPOINTMENT (OUTPATIENT)
Dept: OBGYN | Facility: CLINIC | Age: 37
End: 2022-05-13
Payer: MEDICAID

## 2022-05-13 ENCOUNTER — GYNECOLOGY VISIT (OUTPATIENT)
Dept: OBGYN | Facility: CLINIC | Age: 37
End: 2022-05-13
Payer: MEDICAID

## 2022-05-13 ENCOUNTER — HOSPITAL ENCOUNTER (OUTPATIENT)
Facility: MEDICAL CENTER | Age: 37
End: 2022-05-13
Attending: OBSTETRICS & GYNECOLOGY | Admitting: OBSTETRICS & GYNECOLOGY
Payer: MEDICAID

## 2022-05-13 VITALS — SYSTOLIC BLOOD PRESSURE: 116 MMHG | BODY MASS INDEX: 34.33 KG/M2 | DIASTOLIC BLOOD PRESSURE: 80 MMHG | WEIGHT: 200 LBS

## 2022-05-13 DIAGNOSIS — N93.8 DUB (DYSFUNCTIONAL UTERINE BLEEDING): Primary | ICD-10-CM

## 2022-05-13 PROCEDURE — 99203 OFFICE O/P NEW LOW 30 MIN: CPT | Mod: 25 | Performed by: OBSTETRICS & GYNECOLOGY

## 2022-05-13 PROCEDURE — 76830 TRANSVAGINAL US NON-OB: CPT | Mod: TC | Performed by: OBSTETRICS & GYNECOLOGY

## 2022-05-13 ASSESSMENT — FIBROSIS 4 INDEX: FIB4 SCORE: 0.8

## 2022-05-13 NOTE — PROGRESS NOTES
Chief complaint;  This patient is a 36 y.o. female , Patient's last menstrual period was 2022 (approximate). presents complaining of dysfunctional uterine bleeding.    Patient has a history of 3 previous early losses in the last 2 years.    Patient denies bleeding or passage of tissue  The patient has significant pregnancy symptoms such as breast tenderness and nausea.    Review of systems-denies; chest pain, shortness of breath, fever, chills, abdominal pain  Past OB history-  OB History    Para Term  AB Living   6 4 4     4   SAB IAB Ectopic Molar Multiple Live Births             4      # Outcome Date GA Lbr Babatunde/2nd Weight Sex Delivery Anes PTL Lv   6 Current            5 Term 11 40w0d   F Vag-Spont   BRANDAN   4 Term 08 40w0d   M Vag-Spont   BRANDAN   3 Term 06 40w0d   F Vag-Spont   BRANDAN   2 Term 01 40w0d   F Vag-Spont   BRANDAN   1       SAB        Past surgical history- History reviewed. No pertinent surgical history.  Past medical history-   Past Medical History:   Diagnosis Date   • Patient denies medical problems      Allergies- Iodine and Pcn [penicillins]  Present medications-   Outpatient Encounter Medications as of 2022   Medication Sig Dispense Refill   • Prenatal Vit-Fe Fumarate-FA (PRENATAL 1+1 PO) Take  by mouth.     • progesterone (PROMETRIUM) 100 MG Cap Take 200 mg by mouth every day. Per vaginal suppository     • aspirin (ASA) 81 MG Chew Tab chewable tablet Chew 81 mg every day.       No facility-administered encounter medications on file as of 2022.     Family history- no history of breast or ovarian cancer  Social history-   Social History     Socioeconomic History   • Marital status: Single     Spouse name: Not on file   • Number of children: Not on file   • Years of education: Not on file   • Highest education level: Not on file   Occupational History   • Not on file   Tobacco Use   • Smoking status: Never Smoker   • Smokeless tobacco:  Never Used   Vaping Use   • Vaping Use: Never used   Substance and Sexual Activity   • Alcohol use: Not Currently     Comment: occ   • Drug use: Not Currently   • Sexual activity: Yes     Partners: Male     Birth control/protection: None   Other Topics Concern   • Not on file   Social History Narrative   • Not on file     Social Determinants of Health     Financial Resource Strain: Not on file   Food Insecurity: Not on file   Transportation Needs: Not on file   Physical Activity: Not on file   Stress: Not on file   Social Connections: Not on file   Intimate Partner Violence: Not on file   Housing Stability: Not on file         Physical examination;   Alert and oriented x3  General-well-developed well-nourished female in no apparent distress  Vitals:    05/13/22 1008   BP: 116/80   BP Location: Right arm   Patient Position: Sitting   Weight: 90.7 kg (200 lb)     Skin is warm and dry   HEENT-normocephalic,nontraumatic,PERRLA,EOMI  Throat- no edema or erythema  Neck-supple  Cardiovascular-regular rate and rhythm, normal S1-S2 without murmurs or gallops  Lungs-clear to auscultation bilaterally  Back-negative CVA tenderness  Abdomen-nondistended positive bowel sounds soft nontender without masses or hepatosplenomegaly  Pelvic examination;  External genitalia-without lesions  Vagina-no blood, no discharge  Cervix-closed,no gross lesions  Uterus-  6 weeks size, nontender  Adnexa- without mass or tenderness  Extremities-without cyanosis clubbing or edema  Neurologic-grossly intact    Transvaginal ultrasound; as performed and read by myself; intrauterine gestational sac containing armenta pregnancy and yolk sac crown-rump length consistent with 6 weeks 4 days, EDC 1/2/2023.  LMP is unsure by patient and gives a different EDC.  Would use ultrasound EDC  No free pelvic fluid no adnexal masses positive cardiac motion 145 bpm    Impression;  Dysfunctional uterine bleeding-no evidence of ectopic or miscarriage    Plan;      Needs initial OB

## 2023-01-09 ENCOUNTER — TELEPHONE (OUTPATIENT)
Dept: OBGYN | Facility: CLINIC | Age: 38
End: 2023-01-09

## 2023-01-09 NOTE — LETTER
January 9, 2023            Crys Kwon was seen in my office on 05/13/2022 for a Confirmation of pregnancy, Patient's last menstrual period was 03/24/2022 (approximate patient reported) A transvaginal ultrasound was performed on 05/13/2022. Pregnancy was confirmed with armenta pregnancy measured 6 weeks 4 days with positive cardiac motion.    Pt did not continue her prenatal care in our office and did not delivered at [facility]        Thank you,          Jaswant Hayes M.D.      Electronically Signed

## 2023-03-20 ENCOUNTER — TELEPHONE (OUTPATIENT)
Dept: OBGYN | Facility: CLINIC | Age: 38
End: 2023-03-20

## 2023-03-20 NOTE — TELEPHONE ENCOUNTER
Nurse from [place] asking if we have followed this patient with her pregnancy. Informed Nurse she has only been seen once in our office nurse understood

## 2023-04-20 ENCOUNTER — GYNECOLOGY VISIT (OUTPATIENT)
Dept: OBGYN | Facility: CLINIC | Age: 38
End: 2023-04-20
Payer: MEDICAID

## 2023-04-20 VITALS — DIASTOLIC BLOOD PRESSURE: 74 MMHG | WEIGHT: 202 LBS | BODY MASS INDEX: 34.67 KG/M2 | SYSTOLIC BLOOD PRESSURE: 130 MMHG

## 2023-04-20 DIAGNOSIS — N92.6 IRREGULAR PERIODS: Primary | ICD-10-CM

## 2023-04-20 ASSESSMENT — FIBROSIS 4 INDEX: FIB4 SCORE: 0.82

## 2023-04-20 NOTE — PROGRESS NOTES
Pt here to discuss luteal phase defect  Pt states she needs a compounding rx for progesterone  Good # [phone] (home)    Pharmacy verified.

## 2023-04-20 NOTE — PROGRESS NOTES
Patient presented for discussion of vaginal progesterone for luteal phase defect    In the middle of a discussion while I was looking up information for her and I, she got frustrated and left.  She left without exam    Tracey PATELM, APRN

## 2023-06-05 ENCOUNTER — HOSPITAL ENCOUNTER (EMERGENCY)
Facility: MEDICAL CENTER | Age: 38
End: 2023-06-05
Attending: EMERGENCY MEDICINE
Payer: MEDICAID

## 2023-06-05 ENCOUNTER — APPOINTMENT (OUTPATIENT)
Dept: RADIOLOGY | Facility: MEDICAL CENTER | Age: 38
End: 2023-06-05
Attending: EMERGENCY MEDICINE
Payer: MEDICAID

## 2023-06-05 VITALS
HEART RATE: 78 BPM | OXYGEN SATURATION: 96 % | BODY MASS INDEX: 34.25 KG/M2 | TEMPERATURE: 97.1 F | HEIGHT: 64 IN | RESPIRATION RATE: 16 BRPM | WEIGHT: 200.62 LBS | DIASTOLIC BLOOD PRESSURE: 81 MMHG | SYSTOLIC BLOOD PRESSURE: 113 MMHG

## 2023-06-05 DIAGNOSIS — M54.2 NECK PAIN: ICD-10-CM
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
## 2023-06-05 DIAGNOSIS — R10.9 FLANK PAIN: ICD-10-CM

## 2023-06-05 PROCEDURE — A9270 NON-COVERED ITEM OR SERVICE: HCPCS | Mod: UD | Performed by: EMERGENCY MEDICINE

## 2023-06-05 PROCEDURE — 99284 EMERGENCY DEPT VISIT MOD MDM: CPT

## 2023-06-05 PROCEDURE — 71045 X-RAY EXAM CHEST 1 VIEW: CPT

## 2023-06-05 PROCEDURE — 700102 HCHG RX REV CODE 250 W/ 637 OVERRIDE(OP): Mod: UD | Performed by: EMERGENCY MEDICINE

## 2023-06-05 RX ORDER — ACETAMINOPHEN 325 MG/1
650 TABLET ORAL ONCE
Status: COMPLETED | OUTPATIENT
Start: 2023-06-05 | End: 2023-06-05

## 2023-06-05 RX ADMIN — ACETAMINOPHEN 650 MG: 325 TABLET, FILM COATED ORAL at 23:25

## 2023-06-05 ASSESSMENT — FIBROSIS 4 INDEX: FIB4 SCORE: 0.82

## 2023-06-05 ASSESSMENT — PAIN DESCRIPTION - PAIN TYPE: TYPE: ACUTE PAIN

## 2023-06-06 NOTE — ED NOTES
"RN at bedside to obtain UA sample. Pt states,\"I dont need a urine sample.\" RN educates patient on UA indication to assess for blood in urine r/t pt complaint of flank pain.  Pt states, \"I told her I don't have blood in my urine.\"  RN educates patient that blood may not be visible to eye and UA is still indicated.  Pt refusing to give sample.  ERP aware.   "
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
## 2023-06-06 NOTE — ED NOTES
Pt ambulates to room with steady gait.  Baby with patient.  Pt reports pain started today. Pt standing in room when RN walks in.